# Patient Record
Sex: MALE | Race: WHITE | NOT HISPANIC OR LATINO | Employment: OTHER | URBAN - METROPOLITAN AREA
[De-identification: names, ages, dates, MRNs, and addresses within clinical notes are randomized per-mention and may not be internally consistent; named-entity substitution may affect disease eponyms.]

---

## 2017-03-29 DIAGNOSIS — Z12.11 ENCOUNTER FOR SCREENING FOR MALIGNANT NEOPLASM OF COLON: ICD-10-CM

## 2017-04-04 ENCOUNTER — ALLSCRIPTS OFFICE VISIT (OUTPATIENT)
Dept: OTHER | Facility: OTHER | Age: 77
End: 2017-04-04

## 2017-10-02 ENCOUNTER — ALLSCRIPTS OFFICE VISIT (OUTPATIENT)
Dept: OTHER | Facility: OTHER | Age: 77
End: 2017-10-02

## 2017-10-03 NOTE — PROGRESS NOTES
Assessment  1  Chronic obstructive pulmonary disease (496) (J44 9)   2  Atrial fibrillation (427 31) (I48 91)    Plan  Atrial fibrillation, Chronic obstructive pulmonary disease    · Follow-up visit in 6 months Evaluation and Treatment  Follow-up  Status: Hold For -  Scheduling  Requested for: 02Oct2017  Screening for colon cancer    · COLONOSCOPY; Status:Temporary Deferral - Pt requests deferral;    10/2/2018  Screening for depression    · *VB-Depression Screening; Status:Complete - Retrospective Authorization;   Done:  [id number] 12:53PM    Discussion/Summary    Patient will call us if he has any problems  Follow-up in 6 months or when necessary  Possible side effects of new medications were reviewed with the patient/guardian today  The treatment plan was reviewed with the patient/guardian  The patient/guardian understands and agrees with the treatment plan      Chief Complaint  Follow-up   Patient is here today for follow up of chronic conditions described in HPI  History of Present Illness  Patient here today for follow-up  Denies any chest pain or shortness of breath  The patient presents with a history of atrial fibrillation  The treatment strategy for this patient is rhythm control  He states his atrial fibrillation has been well controlled since the last visit  He has no comorbid illnesses  He has no significant interval events  Symptoms: The patient is currently asymptomatic  Associated symptoms include no syncope,-no focal neurologic deficit,-no tendency for easy bleeding-and-no tendency for easy bruising  Medications: The patient is not currently on any medications for his atrial fibrillation  The patient states he has been doing well with his COPD control since the last visit  He has no comorbid illnesses  He has no significant interval events  Symptoms: The patient is currently asymptomatic     Medications: the patient is adherent with his medication regimen -He denies medication side effects  Review of Systems    Constitutional: No fever or chills, feels well, no tiredness, no recent weight gain or weight loss  Cardiovascular: as noted in HPI  Respiratory: as noted in HPI  Gastrointestinal: No complaints of abdominal pain, no constipation, no nausea or vomiting, no diarrhea or bloody stools  Genitourinary: No complaints of dysuria, no incontinence, no hesitancy, no nocturia, no genital lesion, no testicular pain  Active Problems  1  Advance directive on file (V49 89) (Z78 9)   2  Atrial fibrillation (427 31) (I48 91)   3  Chronic obstructive pulmonary disease (496) (J44 9)   4  Encounter for screening for malignant neoplasm of colon (V76 51) (Z12 11)   5  Exercise counseling (V65 41) (Z71 82)   6  Rib pain on right side (786 50) (R07 81)   7  Screening for colon cancer (V76 51) (Z12 11)   8  Screening for depression (V79 0) (Z13 89)   9  Screening for genitourinary condition (V81 6) (Z13 89)   10  Special screening for other neurological conditions (V80 09) (Z13 89)   11  Unilateral recurrent inguinal hernia without obstruction or gangrene (550 91) (K40 91)    Past Medical History  1  History of Abdominal pain, RUQ (789 01) (R10 11)   2  History of Abnormal findings on diagnostic imaging of digestive system (793 4) (R93 3)   3  History of Acute back pain (724 5) (M54 9)   4  History of Acute right-sided back pain, unspecified location   5  History of Atypical chest pain (786 59) (R07 89)   6  History of Closed Fracture Of The Pubis (808 2)   7  History of Community acquired pneumonia (5) (J18 9)   8  History of Finger infection (686 9) (L08 9)   9  History of allergic rhinitis (V12 69) (Z87 09)   10  History of atrial fibrillation (V12 59) (Z86 79)   11  History of dizziness (V13 89) (Z87 898)   12  History of Nodule of right lung (793 11) (R91 1)   13  History of Pleural Effusion (511 9)   14  History of Polyuria (788 42) (R35 8)   15   History of Traumatic Pneumothorax (860 0)   16  History of Wound Infection (958 3)    The active problems and past medical history were reviewed and updated today  Surgical History  1  History of Hernia Repair   2  History of Oral Surgery Tooth Extraction    The surgical history was reviewed and updated today  Family History  Father    1  Family history of Diabetes Mellitus (250 00)    The family history was reviewed and updated today  Social History   · Advance directive on file (G63 49) (Z78 9)   · Former smoker (V15 82) (F09 200)   · Marital History - Currently    · No alcohol use   · Retired From Work  The social history was reviewed and updated today  The social history was reviewed and is unchanged  Current Meds   1  B-1 100 MG Oral Tablet; Take 1 tablet daily Recorded   2  [employer] Zinc TABS; Therapy: (Recorded:04Apr2016) to Recorded   3  Diclofenac Sodium 75 MG Oral Tablet Delayed Release; one by mouth twice daily with   food as needed for pain; Therapy: 62GMD2297 to (Keke Topete)  Requested for: 39NQG8094; Last   Rx:04Apr2017 Ordered   4  [place] 150 MG Oral Capsule Recorded   5  Methocarbamol 750 MG Oral Tablet; TAKE 1 TABLET AT BEDTIME; Therapy: 78UAW5136 to (Evaluate:22Tcs9706)  Requested for: 45CSB8731; Last   Rx:19Fbi9232 Ordered   6  V-R Vit C/Juanita Hips TABS; Therapy: (Recorded:07Apr2015) to Recorded    The medication list was reviewed and updated today  Allergies  1  Oil of [place] CAPS    Vitals  Vital Signs    Recorded: 65ELH7889 90:46IE   Systolic 493   Diastolic 70   Height 6 ft    Weight 128 lb 6 oz   BMI Calculated 17 41   BSA Calculated 1 76     Physical Exam    Constitutional   General appearance: No acute distress, well appearing and well nourished  Pulmonary   Respiratory effort: No increased work of breathing or signs of respiratory distress  Auscultation of lungs: Clear to auscultation, equal breath sounds bilaterally, no wheezes, no rales, no rhonci      Cardiovascular Auscultation of heart: Normal rate and rhythm, normal S1 and S2, without murmurs  Examination of extremities for edema and/or varicosities: Normal     Psychiatric   Orientation to person, place and time: Normal     Mood and affect: Normal          Results/Data  *VB-Depression Screening 42DUR4319 12:53PM Saida Nuñez     Test Name Result Flag Reference   Depression Scale Result      Depression Screen - Negative For Symptoms     Falls Risk Assessment (Dx Z13 89 Screen for Neurologic Disorder) 75COH0462 12:52PM User, Ahs     Test Name Result Flag Reference   Falls Risk      No falls in the past year     PHQ-2 Adult Depression Screening 74EDL7500 12:52PM User, Ahs     Test Name Result Flag Reference   PHQ-2 Adult Depression Score 0     Over the last two weeks, how often have you been bothered by any of the following problems? Little interest or pleasure in doing things: Not at all - 0  Feeling down, depressed, or hopeless: Not at all - 0   PHQ-2 Adult Depression Screening Negative         Future Appointments    Date/Time Provider Specialty Site   12/05/2017 01:15 PM Saida Nuñez DO Family Medicine [address] [place] [address]   10/06/2017 11:00 AM KATHY Garza   Cardiology  CARDIOLOGY  [place]     Signatures   Electronically signed by : Shahriar Todd DO; Oct  2 2017  1:42PM EST                       (Author)

## 2017-10-06 ENCOUNTER — ALLSCRIPTS OFFICE VISIT (OUTPATIENT)
Dept: OTHER | Facility: OTHER | Age: 77
End: 2017-10-06

## 2017-10-10 ENCOUNTER — GENERIC CONVERSION - ENCOUNTER (OUTPATIENT)
Dept: OTHER | Facility: OTHER | Age: 77
End: 2017-10-10

## 2017-10-10 ENCOUNTER — HOSPITAL ENCOUNTER (OUTPATIENT)
Dept: ULTRASOUND IMAGING | Facility: HOSPITAL | Age: 77
Discharge: HOME | End: 2017-10-10
Payer: COMMERCIAL

## 2017-10-10 DIAGNOSIS — M79.661 PAIN OF RIGHT LOWER LEG: ICD-10-CM

## 2017-10-10 PROCEDURE — 93971 EXTREMITY STUDY: CPT

## 2017-12-05 ENCOUNTER — ALLSCRIPTS OFFICE VISIT (OUTPATIENT)
Dept: OTHER | Facility: OTHER | Age: 77
End: 2017-12-05

## 2017-12-06 NOTE — PROGRESS NOTES
Assessment    1  Atrial fibrillation (427 31) (I48 91)   2  Chronic obstructive pulmonary disease (496) (J44 9)    Plan  Atrial fibrillation    · Aspirin 81 MG Oral Tablet Delayed Release; TAKE 1 TABLET DAILY  Atrial fibrillation, Chronic obstructive pulmonary disease    · Follow-up visit in 3 months Evaluation and Treatment  Follow-up  Status: Hold For -Scheduling  Requested for: 81YLF5013  Screening for colon cancer    · COLONOSCOPY; Status:Temporary Deferral - Pt requests deferral;    12/5/2018  Screening for genitourinary condition    · *VB - Fall Risk Assessment  (Dx Z13 89 Screen for Neurologic Disorder);Status:Complete;   Done: 01BRH2523 01:17PM    Discussion/Summary    For his postnasal drip, get Claritin over-the-counter and may take plain Robitussin  Push fluids  Call symptoms continue or increase  Possible side effects of new medications were reviewed with the patient/guardian today  The treatment plan was reviewed with the patient/guardian  The patient/guardian understands and agrees with the treatment plan      Chief Complaint  Follow-up  Congested   Patient is here today for follow up of chronic conditions described in HPI  History of Present Illness  Patient here today for follow-up  Patient has been having some congestion  Mild cough  No fever chills  No increased shortness of breath  The patient presents with paroxysmal atrial fibrillation  He states his atrial fibrillation has been stable since the last visit  He has no comorbid illnesses  He has no significant interval events  Symptoms: The patient is currently asymptomatic  Medications: the patient is adherent with his medication regimen  -- He denies medication side effects  The patient states he has been stable with his COPD control since the last visit  Symptoms: worsened coughing  Medications: The patient is not currently on any medications for his COPD        Review of Systems   Constitutional: No fever or chills, feels well, no tiredness, no recent weight gain or weight loss  ENT: as noted in HPI  Cardiovascular: as noted in HPI  Respiratory: as noted in HPI  Gastrointestinal: No complaints of abdominal pain, no constipation, no nausea or vomiting, no diarrhea or bloody stools  Genitourinary: No complaints of dysuria, no incontinence, no hesitancy, no nocturia, no genital lesion, no testicular pain  Active Problems  1  Advance directive on file (V49 89) (Z78 9)   2  Atrial fibrillation (427 31) (I48 91)   3  Chronic obstructive pulmonary disease (496) (J44 9)   4  Encounter for screening for malignant neoplasm of colon (V76 51) (Z12 11)   5  Exercise counseling (V65 41) (Z71 82)   6  Lower leg pain, right (729 5) (M79 661)   7  Rib pain on right side (786 50) (R07 81)   8  Screening for colon cancer (V76 51) (Z12 11)   9  Screening for depression (V79 0) (Z13 89)   10  Screening for genitourinary condition (V81 6) (Z13 89)   11  Special screening for other neurological conditions (V80 09) (Z13 89)   12  Unilateral recurrent inguinal hernia without obstruction or gangrene (550 91) (K40 91)    Past Medical History  1  History of Abdominal pain, RUQ (789 01) (R10 11)   2  History of Abnormal findings on diagnostic imaging of digestive system (793 4) (R93 3)   3  History of Acute back pain (724 5) (M54 9)   4  History of Acute right-sided back pain, unspecified location   5  History of Atypical chest pain (786 59) (R07 89)   6  History of Closed Fracture Of The Pubis (808 2)   7  History of Community acquired pneumonia (5) (J18 9)   8  History of Finger infection (686 9) (L08 9)   9  History of allergic rhinitis (V12 69) (Z87 09)   10  History of atrial fibrillation (V12 59) (Z86 79)   11  History of dizziness (V13 89) (Z87 898)   12  History of Nodule of right lung (793 11) (R91 1)   13  History of Pleural Effusion (511 9)   14  History of Polyuria (788 42) (R35 8)   15  History of Traumatic Pneumothorax (860 0)   16   History of Wound Infection (958 3)    The active problems and past medical history were reviewed and updated today  Surgical History  1  History of Hernia Repair   2  History of Oral Surgery Tooth Extraction    The surgical history was reviewed and updated today  Family History  Father    1  Family history of Diabetes Mellitus (250 00)    The family history was reviewed and updated today  Social History     · Advance directive on file (W44 00) (Z78 9)   · Former smoker (V15 82) (M19 908)   · Marital History - Currently    · No alcohol use   · Retired From Work  The social history was reviewed and updated today  The social history was reviewed and is unchanged  Current Meds   1  B-1 100 MG Oral Tablet; Take 1 tablet daily Recorded   2  [employer] Z[employer] TABS; Therapy: (Recorded:04Apr2016) to Recorded   3  Diclofenac Sodium 75 MG Oral Tablet Delayed Release; one by mouth twice daily with food as needed for pain; Therapy: 72VHZ2536 to (Kraig Rao)  Requested for: 17RKD2946; Last Rx:04Apr2017 Ordered   4  [place] 150 MG Oral Capsule Recorded   5  Methocarbamol 750 MG Oral Tablet; TAKE 1 TABLET AT BEDTIME; Therapy: 59OPV8612 to (Evaluate:24Gpl5193)  Requested for: 50LWQ2239; Last Rx:96Vke6152 Ordered   6  V-R Vit C/Juanita Hips TABS; Therapy: (Recorded:07Apr2015) to Recorded    The medication list was reviewed and updated today  Allergies  1  Oil of [place] CAPS    Vitals  Vital Signs    Recorded: 46NBY3806 78:13YX   Systolic 112   Diastolic 60   Height 6 ft    Weight 132 lb 8 oz   BMI Calculated 17 97   BSA Calculated 1 79       Physical Exam   Constitutional  General appearance: No acute distress, well appearing and well nourished  Ears, Nose, Mouth, and Throat  Oropharynx: Abnormal   The posterior pharynx Mucus postnasal drip, but-- was not erythematous-- and-- did not have an exudate  Pulmonary  Respiratory effort: No increased work of breathing or signs of respiratory distress  Auscultation of lungs: Clear to auscultation, equal breath sounds bilaterally, no wheezes, no rales, no rhonci  Cardiovascular  Auscultation of heart: Normal rate and rhythm, normal S1 and S2, without murmurs     Examination of extremities for edema and/or varicosities: Normal    Psychiatric  Orientation to person, place and time: Normal    Mood and affect: Normal          Results/Data  *VB - Fall Risk Assessment  (Dx Z13 89 Screen for Neurologic Disorder) 10BZO4402 01:17PM [employer]     Test Name Result Flag Reference   Falls Risk      No falls in the past year         Signatures   Electronically signed by : Erick Vivar DO; Dec  5 2017  2:00PM EST                       (Author)

## 2018-01-10 NOTE — MISCELLANEOUS
Message  Patient requested EKG be sent to our office from [facility] today where is was having his workup done for upcoming hernia surgery  Josefina Augustine   was told EKG was abnormal and called here  Reviewed chart with PG and EKG that was sent   pt has hx of PAF EKg from 4/6/16 shows Afib  Per Dr Nicolasa Shearer give patient an appointment for tomorrow and he will be pulled over to see patient  Patient called [facility] and ask to have surgeon send information about upcoming procedure  Active Problems    1  Abdominal pain, RUQ (789 01) (R10 11)   2  Abnormal findings on diagnostic imaging of digestive system (793 4) (R93 3)   3  Allergic rhinitis (477 9) (J30 9)   4  Atrial fibrillation (427 31) (I48 91)   5  Chronic obstructive pulmonary disease (496) (J44 9)   6  Dizziness (780 4) (R42)   7  Encounter for screening for malignant neoplasm of colon (V76 51) (Z12 11)   8  Finger infection (686 9) (L08 9)   9  Need for prophylactic vaccination and inoculation against influenza (V04 81) (Z23)   10  Nodule of right lung (793 11) (R91 1)   11  Special screening for other neurological conditions (V80 09) (Z13 89)   12  Unilateral recurrent inguinal hernia without obstruction or gangrene (550 91) (K40 91)    Current Meds   1  Aspirin 81 MG Oral Tablet; TAKE 1 TABLET DAILY; Therapy: 24DSV7774 to Recorded   2  B-1 100 MG Oral Tablet; Take 1 tablet daily Recorded   3  [employer] Z[employer] TABS; Therapy: (Recorded:54Sgv3372) to Recorded   4  [place] 150 MG Oral Capsule Recorded   5  V-R Vit C/Juanita Hips TABS; Therapy: (Recorded:58Sow5442) to Recorded    Allergies    1   No Known Drug Allergies    Signatures   Electronically signed by : Kylie Bustamante, ; Apr 6 2016  1:31PM EST                       (Author)

## 2018-01-13 VITALS
HEIGHT: 72 IN | DIASTOLIC BLOOD PRESSURE: 70 MMHG | SYSTOLIC BLOOD PRESSURE: 128 MMHG | BODY MASS INDEX: 17.39 KG/M2 | WEIGHT: 128.38 LBS

## 2018-01-13 VITALS
WEIGHT: 134.13 LBS | DIASTOLIC BLOOD PRESSURE: 78 MMHG | BODY MASS INDEX: 18.17 KG/M2 | SYSTOLIC BLOOD PRESSURE: 140 MMHG | HEIGHT: 72 IN

## 2018-01-13 NOTE — RESULT NOTES
Verified Results  VAS LOWER LIMB VENOUS DUPLEX STUDY, UNILATERAL/LIMITED 79MJU1826 12:56PM Michael Castanon Order Number: [id number]    - Patient Instructions: To schedule this appointment, please contact Central Scheduling at [phone]  Test Name Result Flag Reference   VAS LOWER LIMB VENOUS DUPLEX STUDY, UNILATERAL/LIMITED (Report)     [facility] REPORT   CLINICAL:   Indications: Right Limb Pain [M79 609]  Right Calf pain   The patient has no history of hypercoagulable state or obesity  Clinical: Palp abnormality right distal calf  FINDINGS:      Segment Right      Left          Impression    Impression       CFV   Normal (Patent) Normal (Patent)             CONCLUSION:   Impression:   RIGHT LOWER LIMB: NORMAL   No evidence of acute or chronic deep vein thrombosis   No evidence of superficial thrombophlebitis noted  Doppler evaluation shows a normal response to augmentation maneuvers  Popliteal, posterior tibial and anterior tibial arterial Doppler waveforms are   triphasic   LEFT LOWER LIMB LIMITED: NORMAL   Evaluation shows no evidence of thrombus in the common femoral vein  Doppler evaluation shows a normal response to augmentation maneuvers  Tech note: Preliminary report given to [facility] @ Dr Manjinder Andersen office        SIGNATURE:   Electronically Signed by: Santo Montana MD, RPVI on 2017-10-10 02:35:02 PM

## 2018-01-22 VITALS
SYSTOLIC BLOOD PRESSURE: 102 MMHG | BODY MASS INDEX: 17.95 KG/M2 | DIASTOLIC BLOOD PRESSURE: 60 MMHG | HEIGHT: 72 IN | WEIGHT: 132.5 LBS

## 2018-03-19 ENCOUNTER — TELEPHONE (OUTPATIENT)
Dept: FAMILY MEDICINE CLINIC | Facility: CLINIC | Age: 78
End: 2018-03-19

## 2018-03-19 DIAGNOSIS — I48.91 ATRIAL FIBRILLATION, UNSPECIFIED TYPE (HCC): ICD-10-CM

## 2018-03-19 DIAGNOSIS — J42 CHRONIC BRONCHITIS, UNSPECIFIED CHRONIC BRONCHITIS TYPE (HCC): Primary | ICD-10-CM

## 2018-03-19 PROBLEM — M79.661 PAIN OF RIGHT LOWER LEG: Status: ACTIVE | Noted: 2017-10-10

## 2018-03-19 RX ORDER — DICLOFENAC SODIUM 75 MG/1
TABLET, DELAYED RELEASE ORAL
COMMUNITY
Start: 2016-06-03 | End: 2018-07-11 | Stop reason: ALTCHOICE

## 2018-03-19 RX ORDER — METHION/INOS/CHOL BT/B COM/LIV 110MG-86MG
1 CAPSULE ORAL DAILY
COMMUNITY

## 2018-03-19 RX ORDER — B-COMPLEX WITH VITAMIN C
TABLET ORAL
COMMUNITY

## 2018-03-19 RX ORDER — UBIDECARENONE 200 MG
CAPSULE ORAL
COMMUNITY

## 2018-03-19 RX ORDER — ASPIRIN 81 MG/1
1 TABLET ORAL DAILY
COMMUNITY
Start: 2017-12-05 | End: 2018-08-22

## 2018-03-19 RX ORDER — METHOCARBAMOL 750 MG/1
1 TABLET, FILM COATED ORAL
COMMUNITY
Start: 2016-06-03 | End: 2018-07-11 | Stop reason: ALTCHOICE

## 2018-03-19 NOTE — TELEPHONE ENCOUNTER
Needs order for complete labs  he has been having some problems with his toes and kalyani believes he should have complete labs   Done  hafsa for diabetis and all

## 2018-04-04 ENCOUNTER — OFFICE VISIT (OUTPATIENT)
Dept: FAMILY MEDICINE CLINIC | Facility: CLINIC | Age: 78
End: 2018-04-04
Payer: COMMERCIAL

## 2018-04-04 ENCOUNTER — TELEPHONE (OUTPATIENT)
Dept: FAMILY MEDICINE CLINIC | Facility: CLINIC | Age: 78
End: 2018-04-04

## 2018-04-04 VITALS
DIASTOLIC BLOOD PRESSURE: 60 MMHG | WEIGHT: 139 LBS | HEIGHT: 72 IN | SYSTOLIC BLOOD PRESSURE: 104 MMHG | BODY MASS INDEX: 18.83 KG/M2

## 2018-04-04 DIAGNOSIS — J06.9 UPPER RESPIRATORY TRACT INFECTION, UNSPECIFIED TYPE: Primary | ICD-10-CM

## 2018-04-04 PROCEDURE — 3725F SCREEN DEPRESSION PERFORMED: CPT | Performed by: FAMILY MEDICINE

## 2018-04-04 PROCEDURE — 99213 OFFICE O/P EST LOW 20 MIN: CPT | Performed by: FAMILY MEDICINE

## 2018-04-04 RX ORDER — GUAIFENESIN AND CODEINE PHOSPHATE 100; 10 MG/5ML; MG/5ML
5 SOLUTION ORAL 3 TIMES DAILY PRN
Qty: 120 ML | Refills: 0 | Status: SHIPPED | OUTPATIENT
Start: 2018-04-04 | End: 2018-07-11 | Stop reason: ALTCHOICE

## 2018-04-04 NOTE — PROGRESS NOTES
Assessment/Plan:  Patient will advance diet as tolerated  Push fluids  Rx for Robitussin with codeine if needed  Call symptoms continue or increase  We will try to track down his blood work  Follow-up in 6 months or p r n  No problem-specific Assessment & Plan notes found for this encounter  Diagnoses and all orders for this visit:    Upper respiratory tract infection, unspecified type  -     guaifenesin-codeine (GUAIFENESIN AC) 100-10 MG/5ML liquid; Take 5 mL by mouth 3 (three) times a day as needed for cough          Subjective:      Patient ID: Rommel Young is a 78 y o  male  Patient here stating that last week started with cough and congestion nausea and vomiting and diarrhea  Vomiting diarrhea has resolved, still feels a little nauseous  Still coughing mostly at night  No fever chills  URI    This is a new problem  The current episode started in the past 7 days  The problem has been gradually improving  Associated symptoms include congestion, coughing, diarrhea (Resolved), nausea and vomiting ( resolved)  Pertinent negatives include no chest pain  He has tried nothing for the symptoms  The following portions of the patient's history were reviewed and updated as appropriate:   He  has a past medical history of Allergic rhinitis; Atrial fibrillation ([address] ); Atypical chest pain; Closed fracture of pubis ([address] ); Community acquired pneumonia; Nodule of right lung; Pleural effusion; Polyuria; and Traumatic pneumothorax  He   Patient Active Problem List    Diagnosis Date Noted    Pain of right lower leg 10/10/2017    Rib pain on right side 06/01/2016    Atrial fibrillation ([address] ) 04/18/2013    Chronic obstructive pulmonary disease ([address] ) 04/18/2013     He  has a past surgical history that includes Hernia repair and Tooth extraction  His family history includes Diabetes in his father  He  reports that he quit smoking about 22 years ago  His smoking use included Pipe and Cigars   He has never used smokeless tobacco  He reports that he does not drink alcohol or use drugs  Current Outpatient Prescriptions   Medication Sig Dispense Refill    ascorbic acid with irene hips 1000 MG tablet Take by mouth      aspirin (ECOTRIN LOW STRENGTH) 81 mg EC tablet Take 1 tablet by mouth daily      diclofenac (VOLTAREN) 75 mg EC tablet Take by mouth      [place] 150 MG CAPS Take by mouth      methocarbamol (ROBAXIN) 750 mg tablet Take 1 tablet by mouth      Thiamine HCl (VITAMIN B-1) 100 MG TABS Take 1 tablet by mouth daily      Zinc 100 MG TABS Take by mouth      guaifenesin-codeine (GUAIFENESIN AC) 100-10 MG/5ML liquid Take 5 mL by mouth 3 (three) times a day as needed for cough 120 mL 0     No current facility-administered medications for this visit  Current Outpatient Prescriptions on File Prior to Visit   Medication Sig    ascorbic acid with irene hips 1000 MG tablet Take by mouth    aspirin (ECOTRIN LOW STRENGTH) 81 mg EC tablet Take 1 tablet by mouth daily    diclofenac (VOLTAREN) 75 mg EC tablet Take by mouth    [place] 150 MG CAPS Take by mouth    methocarbamol (ROBAXIN) 750 mg tablet Take 1 tablet by mouth    Thiamine HCl (VITAMIN B-1) 100 MG TABS Take 1 tablet by mouth daily    Zinc 100 MG TABS Take by mouth     No current facility-administered medications on file prior to visit  He is allergic to origanum oil       Review of Systems   Constitutional: Negative  HENT: Positive for congestion  Respiratory: Positive for cough  Cardiovascular: Negative for chest pain  Gastrointestinal: Positive for diarrhea (Resolved), nausea and vomiting ( resolved)  Genitourinary: Negative  Objective:      /60   Ht 6' (1 829 m)   Wt 63 kg (139 lb)   BMI 18 85 kg/m²          Physical Exam   Constitutional: He is oriented to person, place, and time  He appears well-developed and well-nourished  No distress  Cardiovascular: Normal rate  No murmur heard    Heart rate irregularly irregular   Pulmonary/Chest: Effort normal and breath sounds normal  No respiratory distress  He has no wheezes  He has no rales  Neurological: He is alert and oriented to person, place, and time  Psychiatric: He has a normal mood and affect   His behavior is normal

## 2018-04-16 ENCOUNTER — TELEPHONE (OUTPATIENT)
Dept: FAMILY MEDICINE CLINIC | Facility: CLINIC | Age: 78
End: 2018-04-16

## 2018-04-16 DIAGNOSIS — R05.3 CHRONIC COUGH: Primary | ICD-10-CM

## 2018-04-16 NOTE — TELEPHONE ENCOUNTER
HE IS STILL NO BETTER  Ferol Jean Marie HE HAS A DRY HACKING COUGH  WHAT SHOULD THEY DO DO YOU THINK HE NEEDS CXR/  ALSO STILL NO LABS

## 2018-04-17 ENCOUNTER — TELEPHONE (OUTPATIENT)
Dept: FAMILY MEDICINE CLINIC | Facility: CLINIC | Age: 78
End: 2018-04-17

## 2018-04-17 DIAGNOSIS — R91.1 LUNG NODULE: Primary | ICD-10-CM

## 2018-04-17 NOTE — TELEPHONE ENCOUNTER
Chest x-ray showed a 19 millimeter nodule right middle lobe  They recommended CT scan  Patient had a nodule 4 years ago, but that was in the lower lobe on the right side  I will order another CT scan to evaluate

## 2018-04-18 ENCOUNTER — TELEPHONE (OUTPATIENT)
Dept: FAMILY MEDICINE CLINIC | Facility: CLINIC | Age: 78
End: 2018-04-18

## 2018-04-18 DIAGNOSIS — R91.1 LUNG NODULE: Primary | ICD-10-CM

## 2018-04-18 NOTE — TELEPHONE ENCOUNTER
Call need to re order ct  Needs   ct chest with contrast that what radiologist recommends    Fax to [phone]

## 2018-04-25 ENCOUNTER — TELEPHONE (OUTPATIENT)
Dept: FAMILY MEDICINE CLINIC | Facility: CLINIC | Age: 78
End: 2018-04-25

## 2018-04-25 DIAGNOSIS — R91.1 NODULE OF LEFT LUNG: Primary | ICD-10-CM

## 2018-04-25 NOTE — TELEPHONE ENCOUNTER
Cat scan shows a left sided lung nodule 13 x 7 millimeters  Suspicious for lung cancer  We will get a PET scan    I put in the order, please give to Fer Smith to schedule were patient wants to get it done

## 2018-04-25 NOTE — TELEPHONE ENCOUNTER
CT CHEST RESULTS    13X7MM NODULE IN LEFT UPPER LOBE, SUSPICIOUS FOR NEOPLASM  PET SCAN IS RECOMMENDED   NO ABNORMALITY IN MEDIAL ASPECT OF RIGHT UPPER LOBE  THEY FAXED IT HERE BUT WE STILL HAVE NOT GOTTEN IT

## 2018-05-03 ENCOUNTER — TELEPHONE (OUTPATIENT)
Dept: FAMILY MEDICINE CLINIC | Facility: CLINIC | Age: 78
End: 2018-05-03

## 2018-05-03 DIAGNOSIS — R91.1 NODULE OF LEFT LUNG: Primary | ICD-10-CM

## 2018-05-03 NOTE — TELEPHONE ENCOUNTER
Pet scan showed the left lung nodule  It did have some uptake, may be infectious versus new malignancy  Recommend follow-up CT scan, noncontrast, in 3 months  We could also refer him to pulmonology, if agreeable    I will put in the order for the CT scan

## 2018-05-04 ENCOUNTER — TELEPHONE (OUTPATIENT)
Dept: FAMILY MEDICINE CLINIC | Facility: CLINIC | Age: 78
End: 2018-05-04

## 2018-05-04 ENCOUNTER — TELEPHONE (OUTPATIENT)
Dept: PULMONOLOGY | Facility: CLINIC | Age: 78
End: 2018-05-04

## 2018-05-04 NOTE — TELEPHONE ENCOUNTER
I called patient to offer him an appt for Monday 5/7 w/ Dr Rena Franklin made special arrangements to squeeze patient into his busy schedule  When I first called patient, he was friendly and appreciative for getting him in so soon  He then asked if we could call [facility] to request the disk so he could pick it up  I spoke with Tona Matias from [facility]) and she told me they will try their best to have the disk ready by no later than 6pm tonight  I called patient back for the 2nd time to give him all the info, this time pt was rude the whole time, using profanity, refusing to  the disk, he then stated "I'm not picking up the disk, you are going to  the disk" and that he will call me back  I right away call Dr Asha Cloud office, spoke with his Nurse Thomas Face to notify her of what the patient had said  Nurse said she will call the patient and talk to him  Nurse called me back to inform me "Pt does not want to come in because whoever they talked to hang up on him twice" I reassured her, that never happened and that I actually had a co-worker next to me the whole time  Dr Rena Franklin was notified

## 2018-05-04 NOTE — TELEPHONE ENCOUNTER
They called and said they were not happy with the pulmonary drs office that called swelling of the ankles they want someone else  Teresa Bynum gave them info for drs  they called back and have appt Monday at 2:30 with dr Nanci Mcardle in [place]    FYI

## 2018-05-18 ENCOUNTER — TELEPHONE (OUTPATIENT)
Dept: FAMILY MEDICINE CLINIC | Facility: CLINIC | Age: 78
End: 2018-05-18

## 2018-05-18 DIAGNOSIS — J06.9 UPPER RESPIRATORY TRACT INFECTION, UNSPECIFIED TYPE: Primary | ICD-10-CM

## 2018-05-18 RX ORDER — BENZONATATE 100 MG/1
100 CAPSULE ORAL 3 TIMES DAILY PRN
Qty: 20 CAPSULE | Refills: 0 | Status: SHIPPED | OUTPATIENT
Start: 2018-05-18 | End: 2018-05-24 | Stop reason: SDUPTHER

## 2018-05-18 RX ORDER — CEFDINIR 300 MG/1
300 CAPSULE ORAL EVERY 12 HOURS SCHEDULED
Qty: 20 CAPSULE | Refills: 0 | Status: SHIPPED | OUTPATIENT
Start: 2018-05-18 | End: 2018-05-28

## 2018-05-18 NOTE — TELEPHONE ENCOUNTER
I put in for [address] and [employer]  Push fluids    Make appointment if symptoms continue or increase

## 2018-05-18 NOTE — TELEPHONE ENCOUNTER
Wife called very concerned about patients dry cough, they have not received any results from Pulmonary but they want something done right away as antibiotic or cough syrup bc he is not getting any better

## 2018-05-24 DIAGNOSIS — J06.9 UPPER RESPIRATORY TRACT INFECTION, UNSPECIFIED TYPE: ICD-10-CM

## 2018-05-24 RX ORDER — BENZONATATE 100 MG/1
100 CAPSULE ORAL 3 TIMES DAILY PRN
Qty: 30 CAPSULE | Refills: 0 | Status: SHIPPED | OUTPATIENT
Start: 2018-05-24 | End: 2018-06-14 | Stop reason: SDUPTHER

## 2018-05-24 NOTE — TELEPHONE ENCOUNTER
The med you gave him is working wonderful but the benazotate you only gave 20  If you want it for 10 days he needs 30

## 2018-06-13 ENCOUNTER — TELEPHONE (OUTPATIENT)
Dept: FAMILY MEDICINE CLINIC | Facility: CLINIC | Age: 78
End: 2018-06-13

## 2018-06-13 NOTE — TELEPHONE ENCOUNTER
RELAPSING AGAIN, WET COUGH, NOT SLEEPING WELL  APPETITE UP AND DOWN, NIGHT SWEATS  DO YOU WANT TO SEE HIM OR PRESCRIBE SOMETHING AGAIN?

## 2018-06-14 ENCOUNTER — OFFICE VISIT (OUTPATIENT)
Dept: FAMILY MEDICINE CLINIC | Facility: CLINIC | Age: 78
End: 2018-06-14
Payer: COMMERCIAL

## 2018-06-14 VITALS
HEIGHT: 72 IN | WEIGHT: 136.2 LBS | BODY MASS INDEX: 18.45 KG/M2 | DIASTOLIC BLOOD PRESSURE: 90 MMHG | TEMPERATURE: 98.6 F | SYSTOLIC BLOOD PRESSURE: 130 MMHG

## 2018-06-14 DIAGNOSIS — J06.9 UPPER RESPIRATORY TRACT INFECTION, UNSPECIFIED TYPE: Primary | ICD-10-CM

## 2018-06-14 PROCEDURE — 99213 OFFICE O/P EST LOW 20 MIN: CPT | Performed by: FAMILY MEDICINE

## 2018-06-14 RX ORDER — CEFDINIR 300 MG/1
300 CAPSULE ORAL EVERY 12 HOURS SCHEDULED
Qty: 20 CAPSULE | Refills: 0 | Status: SHIPPED | OUTPATIENT
Start: 2018-06-14 | End: 2018-06-24

## 2018-06-14 RX ORDER — BENZONATATE 100 MG/1
100 CAPSULE ORAL 3 TIMES DAILY PRN
Qty: 30 CAPSULE | Refills: 0 | Status: SHIPPED | OUTPATIENT
Start: 2018-06-14 | End: 2018-07-11 | Stop reason: ALTCHOICE

## 2018-06-14 NOTE — PROGRESS NOTES
Assessment/Plan:  Rx put in for [employer] and [employer] again  Push fluids  Call if symptoms continue or increase  Follow up with pulmonology as scheduled  It appears he is back in Afib again  He will continue his aspirin therapy and follow up with Cardiology as scheduled  No problem-specific Assessment & Plan notes found for this encounter  Diagnoses and all orders for this visit:    Upper respiratory tract infection, unspecified type  -     benzonatate (TESSALON PERLES) 100 mg capsule; Take 1 capsule (100 mg total) by mouth 3 (three) times a day as needed for cough  -     cefdinir (OMNICEF) 300 mg capsule; Take 1 capsule (300 mg total) by mouth every 12 (twelve) hours for 10 days    Other orders  -     Iron-Vitamins (GERITOL COMPLETE PO); Take by mouth          Subjective:      Patient ID: Harmony Mackenzie is a 78 y o  male  Patient here today stating for the last couple days has had a mildly productive cough, sweating off and on  No nausea vomiting or diarrhea  It feels like the cold the had last month  That cold resolved on Omnicef and Tessalon Perles  Pulmonology put him on Anoro,  Which gave him side effects of anxiety and anal leakage  Patient stopped it  Cough   This is a recurrent problem  The current episode started in the past 7 days  The problem has been unchanged  The problem occurs constantly  The cough is productive of sputum (mildly productive)  Associated symptoms include nasal congestion and sweats  Pertinent negatives include no fever or wheezing  Nothing aggravates the symptoms  He has tried a beta-agonist inhaler and ipratropium inhaler for the symptoms  The treatment provided no relief  His past medical history is significant for COPD  The following portions of the patient's history were reviewed and updated as appropriate:   He  has a past medical history of Allergic rhinitis; Atrial fibrillation ([address] );  Atypical chest pain; Closed fracture of pubis ([address] ); Community acquired pneumonia; Nodule of right lung; Pleural effusion; Polyuria; and Traumatic pneumothorax  He   Patient Active Problem List    Diagnosis Date Noted    Nodule of left lung 04/25/2018    Pain of right lower leg 10/10/2017    Rib pain on right side 06/01/2016    Atrial fibrillation ([facility] ) 04/18/2013    Chronic obstructive pulmonary disease ([facility] ) 04/18/2013     He  has a past surgical history that includes Hernia repair and Tooth extraction  His family history includes Diabetes in his father  He  reports that he quit smoking about 22 years ago  His smoking use included Pipe and Cigars  He has never used smokeless tobacco  He reports that he does not drink alcohol or use drugs  Current Outpatient Prescriptions   Medication Sig Dispense Refill    ascorbic acid with irene hips 1000 MG tablet Take by mouth      aspirin (ECOTRIN LOW STRENGTH) 81 mg EC tablet Take 1 tablet by mouth daily      diclofenac (VOLTAREN) 75 mg EC tablet Take by mouth      [place] 150 MG CAPS Take by mouth      Iron-Vitamins (GERITOL COMPLETE PO) Take by mouth      methocarbamol (ROBAXIN) 750 mg tablet Take 1 tablet by mouth      Thiamine HCl (VITAMIN B-1) 100 MG TABS Take 1 tablet by mouth daily      Zinc 100 MG TABS Take by mouth      benzonatate (TESSALON PERLES) 100 mg capsule Take 1 capsule (100 mg total) by mouth 3 (three) times a day as needed for cough 30 capsule 0    cefdinir (OMNICEF) 300 mg capsule Take 1 capsule (300 mg total) by mouth every 12 (twelve) hours for 10 days 20 capsule 0    guaifenesin-codeine (GUAIFENESIN AC) 100-10 MG/5ML liquid Take 5 mL by mouth 3 (three) times a day as needed for cough 120 mL 0     No current facility-administered medications for this visit        Current Outpatient Prescriptions on File Prior to Visit   Medication Sig    ascorbic acid with irene hips 1000 MG tablet Take by mouth    aspirin (ECOTRIN LOW STRENGTH) 81 mg EC tablet Take 1 tablet by mouth daily    diclofenac (VOLTAREN) 75 mg EC tablet Take by mouth    [place] 150 MG CAPS Take by mouth    methocarbamol (ROBAXIN) 750 mg tablet Take 1 tablet by mouth    Thiamine HCl (VITAMIN B-1) 100 MG TABS Take 1 tablet by mouth daily    Zinc 100 MG TABS Take by mouth    guaifenesin-codeine (GUAIFENESIN AC) 100-10 MG/5ML liquid Take 5 mL by mouth 3 (three) times a day as needed for cough    [DISCONTINUED] benzonatate (TESSALON PERLES) 100 mg capsule Take 1 capsule (100 mg total) by mouth 3 (three) times a day as needed for cough     No current facility-administered medications on file prior to visit  He is allergic to anoro ellipta [umeclidinium-vilanterol] and origanum oil       Review of Systems   Constitutional: Negative for fever  Respiratory: Positive for cough  Negative for wheezing  Cardiovascular: Negative  Gastrointestinal: Negative  Genitourinary: Negative  Objective:      /90   Temp 98 6 °F (37 °C)   Ht 6' (1 829 m)   Wt 61 8 kg (136 lb 3 2 oz)   BMI 18 47 kg/m²          Physical Exam   Constitutional: He is oriented to person, place, and time  He appears well-developed and well-nourished  No distress  Cardiovascular: Normal rate and normal heart sounds  An irregularly irregular rhythm present  Exam reveals no friction rub  No murmur heard  Pulmonary/Chest: Effort normal and breath sounds normal  No respiratory distress  He has no wheezes  He has no rales  Musculoskeletal: He exhibits no edema  Neurological: He is alert and oriented to person, place, and time  Skin: He is not diaphoretic  Psychiatric: He has a normal mood and affect  His behavior is normal  Judgment and thought content normal    Vitals reviewed

## 2018-07-11 ENCOUNTER — OFFICE VISIT (OUTPATIENT)
Dept: FAMILY MEDICINE CLINIC | Facility: CLINIC | Age: 78
End: 2018-07-11
Payer: COMMERCIAL

## 2018-07-11 VITALS
TEMPERATURE: 97.7 F | DIASTOLIC BLOOD PRESSURE: 84 MMHG | SYSTOLIC BLOOD PRESSURE: 118 MMHG | BODY MASS INDEX: 19.39 KG/M2 | WEIGHT: 143.2 LBS | HEIGHT: 72 IN

## 2018-07-11 DIAGNOSIS — J42 CHRONIC BRONCHITIS, UNSPECIFIED CHRONIC BRONCHITIS TYPE (HCC): ICD-10-CM

## 2018-07-11 DIAGNOSIS — F41.9 ANXIETY: ICD-10-CM

## 2018-07-11 DIAGNOSIS — R05.3 CHRONIC COUGH: Primary | ICD-10-CM

## 2018-07-11 PROCEDURE — 99213 OFFICE O/P EST LOW 20 MIN: CPT | Performed by: FAMILY MEDICINE

## 2018-07-11 RX ORDER — BENZONATATE 100 MG/1
100 CAPSULE ORAL 3 TIMES DAILY PRN
Qty: 20 CAPSULE | Refills: 2 | Status: SHIPPED | OUTPATIENT
Start: 2018-07-11 | End: 2018-09-05 | Stop reason: ALTCHOICE

## 2018-07-11 NOTE — PROGRESS NOTES
Assessment/Plan: We will try am on Asmanex inhaler  Rx for [employer]  He will get a repeat CT scan in August   We will refer him to pulmonology  He does not want to treat his anxiety at this time  We will see him back as scheduled or p r n  No problem-specific Assessment & Plan notes found for this encounter  Diagnoses and all orders for this visit:    Chronic cough  -     benzonatate (TESSALON PERLES) 100 mg capsule; Take 1 capsule (100 mg total) by mouth 3 (three) times a day as needed for cough  -     mometasone (ASMANEX TWISTHALER) 220 MCG/INH inhaler; Inhale 1 puff 2 (two) times a day Rinse mouth after use  -     Ambulatory referral to Pulmonology; Future    Anxiety    Chronic bronchitis, unspecified chronic bronchitis type ([facility] )  -     Ambulatory referral to Pulmonology; Future          Subjective:      Patient ID: Sharri Sharma is a 78 y o  male  Patient here today with continued chronic cough  Cough is dry  No fever chills  No nausea vomiting  Patient states the [id number] helped him  Patient is scheduled for a repeat CT scan in August   He does not have a follow-up with pulmonology  Patient has been more under more stress lately due to an ongoing divorce  No SI or HI  Cough   This is a chronic problem  The current episode started more than 1 month ago  The problem has been unchanged  The problem occurs every few hours  The cough is non-productive  Associated symptoms include shortness of breath  Pertinent negatives include no fever, sore throat, sweats or weight loss  Nothing aggravates the symptoms  Treatments tried: Tessalon Perles  The treatment provided mild relief  His past medical history is significant for COPD  Anxiety   Presents for initial visit  Onset was at an unknown time  The problem has been unchanged  Symptoms include shortness of breath  Patient reports no suicidal ideas  Symptoms occur constantly   The severity of symptoms is moderate  The symptoms are aggravated by family issues  The quality of sleep is fair  Past treatments include nothing  The following portions of the patient's history were reviewed and updated as appropriate:   He  has a past medical history of Allergic rhinitis; Atrial fibrillation ([facility] ); Atypical chest pain; Closed fracture of pubis ([facility] ); Community acquired pneumonia; Nodule of right lung; Pleural effusion; Polyuria; and Traumatic pneumothorax  He   Patient Active Problem List    Diagnosis Date Noted    Chronic cough 07/11/2018    Anxiety 07/11/2018    Nodule of left lung 04/25/2018    Pain of right lower leg 10/10/2017    Rib pain on right side 06/01/2016    Atrial fibrillation (HCC) 04/18/2013    Chronic obstructive pulmonary disease ([facility] ) 04/18/2013     He  has a past surgical history that includes Hernia repair and Tooth extraction  His family history includes Diabetes in his father  He  reports that he quit smoking about 22 years ago  His smoking use included Pipe and Cigars  He has never used smokeless tobacco  He reports that he does not drink alcohol or use drugs  Current Outpatient Prescriptions   Medication Sig Dispense Refill    ascorbic acid with irene hips 1000 MG tablet Take by mouth      aspirin (ECOTRIN LOW STRENGTH) 81 mg EC tablet Take 1 tablet by mouth daily      [place] 150 MG CAPS Take by mouth      Iron-Vitamins (GERITOL COMPLETE PO) Take by mouth      Thiamine HCl (VITAMIN B-1) 100 MG TABS Take 1 tablet by mouth daily      Zinc 100 MG TABS Take by mouth      benzonatate (TESSALON PERLES) 100 mg capsule Take 1 capsule (100 mg total) by mouth 3 (three) times a day as needed for cough 20 capsule 2    mometasone (ASMANEX TWISTHALER) 220 MCG/INH inhaler Inhale 1 puff 2 (two) times a day Rinse mouth after use  1 Inhaler 0     No current facility-administered medications for this visit        Current Outpatient Prescriptions on File Prior to Visit   Medication Sig    ascorbic acid with irene hips 1000 MG tablet Take by mouth    aspirin (ECOTRIN LOW STRENGTH) 81 mg EC tablet Take 1 tablet by mouth daily    [place] 150 MG CAPS Take by mouth    Iron-Vitamins (GERITOL COMPLETE PO) Take by mouth    Thiamine HCl (VITAMIN B-1) 100 MG TABS Take 1 tablet by mouth daily    Zinc 100 MG TABS Take by mouth    [DISCONTINUED] benzonatate (TESSALON PERLES) 100 mg capsule Take 1 capsule (100 mg total) by mouth 3 (three) times a day as needed for cough    [DISCONTINUED] diclofenac (VOLTAREN) 75 mg EC tablet Take by mouth    [DISCONTINUED] guaifenesin-codeine (GUAIFENESIN AC) 100-10 MG/5ML liquid Take 5 mL by mouth 3 (three) times a day as needed for cough    [DISCONTINUED] methocarbamol (ROBAXIN) 750 mg tablet Take 1 tablet by mouth     No current facility-administered medications on file prior to visit  He is allergic to anoro ellipta [umeclidinium-vilanterol] and origanum oil       Review of Systems   Constitutional: Negative for fever and weight loss  HENT: Negative for sore throat  Respiratory: Positive for cough and shortness of breath  Cardiovascular: Negative  Gastrointestinal: Negative  Genitourinary: Negative  Psychiatric/Behavioral: Negative for suicidal ideas  Objective:      /84 (BP Location: Left arm, Patient Position: Sitting)   Temp 97 7 °F (36 5 °C)   Ht 6' (1 829 m)   Wt 65 kg (143 lb 3 2 oz)   BMI 19 42 kg/m²          Physical Exam   Constitutional: He is oriented to person, place, and time  He appears well-developed and well-nourished  No distress  Cardiovascular: Normal rate  An irregularly irregular rhythm present  Exam reveals no gallop  No murmur heard  Pulmonary/Chest: Breath sounds normal  No respiratory distress  He has no wheezes  He has no rales  Musculoskeletal: He exhibits no edema  Neurological: He is alert and oriented to person, place, and time  Skin: He is not diaphoretic     Psychiatric: He has a normal mood and affect  His behavior is normal  Judgment and thought content normal    Vitals reviewed

## 2018-07-16 ENCOUNTER — APPOINTMENT (OUTPATIENT)
Dept: LAB | Facility: MEDICAL CENTER | Age: 78
End: 2018-07-16
Payer: COMMERCIAL

## 2018-07-16 ENCOUNTER — OFFICE VISIT (OUTPATIENT)
Dept: FAMILY MEDICINE CLINIC | Facility: CLINIC | Age: 78
End: 2018-07-16
Payer: COMMERCIAL

## 2018-07-16 ENCOUNTER — APPOINTMENT (OUTPATIENT)
Dept: RADIOLOGY | Facility: MEDICAL CENTER | Age: 78
End: 2018-07-16
Payer: COMMERCIAL

## 2018-07-16 ENCOUNTER — TRANSCRIBE ORDERS (OUTPATIENT)
Dept: RADIOLOGY | Facility: MEDICAL CENTER | Age: 78
End: 2018-07-16

## 2018-07-16 VITALS
DIASTOLIC BLOOD PRESSURE: 82 MMHG | WEIGHT: 144 LBS | TEMPERATURE: 98.6 F | SYSTOLIC BLOOD PRESSURE: 140 MMHG | BODY MASS INDEX: 19.5 KG/M2 | HEIGHT: 72 IN

## 2018-07-16 DIAGNOSIS — I48.91 ATRIAL FIBRILLATION, UNSPECIFIED TYPE (HCC): ICD-10-CM

## 2018-07-16 DIAGNOSIS — J42 CHRONIC BRONCHITIS, UNSPECIFIED CHRONIC BRONCHITIS TYPE (HCC): ICD-10-CM

## 2018-07-16 DIAGNOSIS — R05.3 CHRONIC COUGH: Primary | ICD-10-CM

## 2018-07-16 DIAGNOSIS — R05.3 CHRONIC COUGH: ICD-10-CM

## 2018-07-16 LAB
ALBUMIN SERPL BCP-MCNC: 3.5 G/DL (ref 3.5–5)
ALP SERPL-CCNC: 79 U/L (ref 46–116)
ALT SERPL W P-5'-P-CCNC: 63 U/L (ref 12–78)
ANION GAP SERPL CALCULATED.3IONS-SCNC: 6 MMOL/L (ref 4–13)
AST SERPL W P-5'-P-CCNC: 53 U/L (ref 5–45)
BASOPHILS # BLD AUTO: 0.05 THOUSANDS/ΜL (ref 0–0.1)
BASOPHILS NFR BLD AUTO: 1 % (ref 0–1)
BILIRUB SERPL-MCNC: 1.13 MG/DL (ref 0.2–1)
BUN SERPL-MCNC: 20 MG/DL (ref 5–25)
CALCIUM SERPL-MCNC: 8.7 MG/DL (ref 8.3–10.1)
CHLORIDE SERPL-SCNC: 109 MMOL/L (ref 100–108)
CO2 SERPL-SCNC: 23 MMOL/L (ref 21–32)
CREAT SERPL-MCNC: 1.07 MG/DL (ref 0.6–1.3)
EOSINOPHIL # BLD AUTO: 0.1 THOUSAND/ΜL (ref 0–0.61)
EOSINOPHIL NFR BLD AUTO: 1 % (ref 0–6)
ERYTHROCYTE [DISTWIDTH] IN BLOOD BY AUTOMATED COUNT: 14.6 % (ref 11.6–15.1)
GFR SERPL CREATININE-BSD FRML MDRD: 68 ML/MIN/1.73SQ M
GLUCOSE SERPL-MCNC: 76 MG/DL (ref 65–140)
HCT VFR BLD AUTO: 47.9 % (ref 36.5–49.3)
HGB BLD-MCNC: 15.1 G/DL (ref 12–17)
IMM GRANULOCYTES # BLD AUTO: 0.04 THOUSAND/UL (ref 0–0.2)
IMM GRANULOCYTES NFR BLD AUTO: 0 % (ref 0–2)
LYMPHOCYTES # BLD AUTO: 3.51 THOUSANDS/ΜL (ref 0.6–4.47)
LYMPHOCYTES NFR BLD AUTO: 36 % (ref 14–44)
MCH RBC QN AUTO: 32.3 PG (ref 26.8–34.3)
MCHC RBC AUTO-ENTMCNC: 31.5 G/DL (ref 31.4–37.4)
MCV RBC AUTO: 103 FL (ref 82–98)
MONOCYTES # BLD AUTO: 0.92 THOUSAND/ΜL (ref 0.17–1.22)
MONOCYTES NFR BLD AUTO: 9 % (ref 4–12)
NEUTROPHILS # BLD AUTO: 5.28 THOUSANDS/ΜL (ref 1.85–7.62)
NEUTS SEG NFR BLD AUTO: 53 % (ref 43–75)
NRBC BLD AUTO-RTO: 0 /100 WBCS
NT-PROBNP SERPL-MCNC: ABNORMAL PG/ML
PLATELET # BLD AUTO: 139 THOUSANDS/UL (ref 149–390)
PMV BLD AUTO: 11.8 FL (ref 8.9–12.7)
POTASSIUM SERPL-SCNC: 4.3 MMOL/L (ref 3.5–5.3)
PROT SERPL-MCNC: 7.4 G/DL (ref 6.4–8.2)
RBC # BLD AUTO: 4.67 MILLION/UL (ref 3.88–5.62)
SODIUM SERPL-SCNC: 138 MMOL/L (ref 136–145)
TSH SERPL DL<=0.05 MIU/L-ACNC: 2.87 UIU/ML (ref 0.36–3.74)
WBC # BLD AUTO: 9.9 THOUSAND/UL (ref 4.31–10.16)

## 2018-07-16 PROCEDURE — 71046 X-RAY EXAM CHEST 2 VIEWS: CPT

## 2018-07-16 PROCEDURE — 36415 COLL VENOUS BLD VENIPUNCTURE: CPT | Performed by: FAMILY MEDICINE

## 2018-07-16 PROCEDURE — 99214 OFFICE O/P EST MOD 30 MIN: CPT | Performed by: FAMILY MEDICINE

## 2018-07-16 PROCEDURE — 83880 ASSAY OF NATRIURETIC PEPTIDE: CPT | Performed by: FAMILY MEDICINE

## 2018-07-16 PROCEDURE — 84443 ASSAY THYROID STIM HORMONE: CPT | Performed by: FAMILY MEDICINE

## 2018-07-16 PROCEDURE — 80053 COMPREHEN METABOLIC PANEL: CPT | Performed by: FAMILY MEDICINE

## 2018-07-16 PROCEDURE — 85025 COMPLETE CBC W/AUTO DIFF WBC: CPT | Performed by: FAMILY MEDICINE

## 2018-07-16 RX ORDER — POTASSIUM CHLORIDE 750 MG/1
10 CAPSULE, EXTENDED RELEASE ORAL DAILY
Qty: 30 CAPSULE | Refills: 3 | Status: SHIPPED | OUTPATIENT
Start: 2018-07-16 | End: 2018-07-27 | Stop reason: SDUPTHER

## 2018-07-16 RX ORDER — FUROSEMIDE 20 MG/1
20 TABLET ORAL DAILY
Qty: 30 TABLET | Refills: 3 | Status: SHIPPED | OUTPATIENT
Start: 2018-07-16 | End: 2018-07-27 | Stop reason: SDUPTHER

## 2018-07-16 NOTE — PROGRESS NOTES
Assessment/Plan:  I believe patient might have some CHF and blood work today  We will place him on 20 mg of Lasix p r n  Along with potassium 10 mEq daily  They will call us if his legs continue to swell  We will see him back as scheduled or p r n  No problem-specific Assessment & Plan notes found for this encounter  Diagnoses and all orders for this visit:    Chronic cough  -     furosemide (LASIX) 20 mg tablet; Take 1 tablet (20 mg total) by mouth daily  -     potassium chloride (MICRO-K) 10 MEQ CR capsule; Take 1 capsule (10 mEq total) by mouth daily  -     NT-BNP PRO  -     TSH, 3rd generation with Free T4 reflex  -     CBC and differential  -     Comprehensive metabolic panel  -     XR chest pa & lateral; Future    Atrial fibrillation, unspecified type (HCC)  -     furosemide (LASIX) 20 mg tablet; Take 1 tablet (20 mg total) by mouth daily  -     potassium chloride (MICRO-K) 10 MEQ CR capsule; Take 1 capsule (10 mEq total) by mouth daily  -     NT-BNP PRO  -     TSH, 3rd generation with Free T4 reflex  -     CBC and differential  -     Comprehensive metabolic panel  -     XR chest pa & lateral; Future    Chronic bronchitis, unspecified chronic bronchitis type (HCC)  -     CBC and differential  -     XR chest pa & lateral; Future          Subjective:      Patient ID: Barbie Neri is a 78 y o  male  Patient here today with continued cough and shortness of breath  Worse when he is lying down  His ankles and lower legs have begun to swell  It is better in the morning when he gets up  No nausea vomiting or fever chills  Cough has been dry  Breathing Problem   He complains of cough, difficulty breathing and shortness of breath  This is a chronic problem  The problem occurs constantly  The problem has been unchanged  Associated symptoms include dyspnea on exertion and orthopnea  Pertinent negatives include no chest pain or weight loss  His symptoms are aggravated by nothing   His symptoms are alleviated by nothing  His past medical history is significant for COPD  The following portions of the patient's history were reviewed and updated as appropriate:   He  has a past medical history of Allergic rhinitis; Atrial fibrillation ([address] ); Atypical chest pain; Closed fracture of pubis ([facility] ); Community acquired pneumonia; Nodule of right lung; Pleural effusion; Polyuria; and Traumatic pneumothorax  He   Patient Active Problem List    Diagnosis Date Noted    Chronic cough 07/11/2018    Anxiety 07/11/2018    Nodule of left lung 04/25/2018    Pain of right lower leg 10/10/2017    Rib pain on right side 06/01/2016    Atrial fibrillation (HCC) 04/18/2013    Chronic obstructive pulmonary disease ([facility] ) 04/18/2013     He  has a past surgical history that includes Hernia repair and Tooth extraction  His family history includes Diabetes in his father  He  reports that he quit smoking about 22 years ago  His smoking use included Pipe and Cigars  He has never used smokeless tobacco  He reports that he does not drink alcohol or use drugs  Current Outpatient Prescriptions   Medication Sig Dispense Refill    ascorbic acid with irene hips 1000 MG tablet Take by mouth      aspirin (ECOTRIN LOW STRENGTH) 81 mg EC tablet Take 1 tablet by mouth daily      benzonatate (TESSALON PERLES) 100 mg capsule Take 1 capsule (100 mg total) by mouth 3 (three) times a day as needed for cough 20 capsule 2    furosemide (LASIX) 20 mg tablet Take 1 tablet (20 mg total) by mouth daily 30 tablet 3    [place] 150 MG CAPS Take by mouth      Iron-Vitamins (GERITOL COMPLETE PO) Take by mouth      mometasone (ASMANEX TWISTHALER) 220 MCG/INH inhaler Inhale 1 puff 2 (two) times a day Rinse mouth after use   1 Inhaler 0    potassium chloride (MICRO-K) 10 MEQ CR capsule Take 1 capsule (10 mEq total) by mouth daily 30 capsule 3    Thiamine HCl (VITAMIN B-1) 100 MG TABS Take 1 tablet by mouth daily      Zinc 100 MG TABS Take by mouth       No current facility-administered medications for this visit  Current Outpatient Prescriptions on File Prior to Visit   Medication Sig    ascorbic acid with irene hips 1000 MG tablet Take by mouth    aspirin (ECOTRIN LOW STRENGTH) 81 mg EC tablet Take 1 tablet by mouth daily    benzonatate (TESSALON PERLES) 100 mg capsule Take 1 capsule (100 mg total) by mouth 3 (three) times a day as needed for cough    [place] 150 MG CAPS Take by mouth    Iron-Vitamins (GERITOL COMPLETE PO) Take by mouth    mometasone (ASMANEX TWISTHALER) 220 MCG/INH inhaler Inhale 1 puff 2 (two) times a day Rinse mouth after use   Thiamine HCl (VITAMIN B-1) 100 MG TABS Take 1 tablet by mouth daily    Zinc 100 MG TABS Take by mouth     No current facility-administered medications on file prior to visit  He is allergic to anoro ellipta [umeclidinium-vilanterol] and origanum oil       Review of Systems   Constitutional: Positive for unexpected weight change ( gaining weight)  Negative for weight loss  Respiratory: Positive for cough and shortness of breath  Cardiovascular: Positive for dyspnea on exertion and leg swelling  Negative for chest pain  Gastrointestinal: Negative  Endocrine: Negative  Genitourinary: Negative  Objective:      /82   Temp 98 6 °F (37 °C)   Ht 6' (1 829 m)   Wt 65 3 kg (144 lb)   BMI 19 53 kg/m²          Physical Exam   Constitutional: He is oriented to person, place, and time  He appears well-developed and well-nourished  No distress  Cardiovascular: S1 normal and S2 normal   An irregularly irregular rhythm present  Tachycardia present  No murmur heard  Pulmonary/Chest: Effort normal and breath sounds normal  No respiratory distress  He has no wheezes  He has no rales  Musculoskeletal: He exhibits edema ( positive edema in the ankles bilaterally)  Neurological: He is alert and oriented to person, place, and time  Skin: He is not diaphoretic  Psychiatric: He has a normal mood and affect  His behavior is normal  Judgment and thought content normal    Vitals reviewed

## 2018-07-17 DIAGNOSIS — I48.91 ATRIAL FIBRILLATION, UNSPECIFIED TYPE (HCC): Primary | ICD-10-CM

## 2018-07-20 ENCOUNTER — TELEPHONE (OUTPATIENT)
Dept: FAMILY MEDICINE CLINIC | Facility: CLINIC | Age: 78
End: 2018-07-20

## 2018-07-20 ENCOUNTER — APPOINTMENT (OUTPATIENT)
Dept: LAB | Facility: MEDICAL CENTER | Age: 78
End: 2018-07-20
Payer: COMMERCIAL

## 2018-07-20 LAB
ALBUMIN SERPL BCP-MCNC: 3.6 G/DL (ref 3.5–5)
ALP SERPL-CCNC: 80 U/L (ref 46–116)
ALT SERPL W P-5'-P-CCNC: 52 U/L (ref 12–78)
ANION GAP SERPL CALCULATED.3IONS-SCNC: 7 MMOL/L (ref 4–13)
AST SERPL W P-5'-P-CCNC: 40 U/L (ref 5–45)
BASOPHILS # BLD AUTO: 0.05 THOUSANDS/ΜL (ref 0–0.1)
BASOPHILS NFR BLD AUTO: 1 % (ref 0–1)
BILIRUB SERPL-MCNC: 0.97 MG/DL (ref 0.2–1)
BUN SERPL-MCNC: 24 MG/DL (ref 5–25)
CALCIUM SERPL-MCNC: 8.8 MG/DL (ref 8.3–10.1)
CHLORIDE SERPL-SCNC: 105 MMOL/L (ref 100–108)
CHOLEST SERPL-MCNC: 111 MG/DL (ref 50–200)
CO2 SERPL-SCNC: 26 MMOL/L (ref 21–32)
CREAT SERPL-MCNC: 1.13 MG/DL (ref 0.6–1.3)
EOSINOPHIL # BLD AUTO: 0.15 THOUSAND/ΜL (ref 0–0.61)
EOSINOPHIL NFR BLD AUTO: 2 % (ref 0–6)
ERYTHROCYTE [DISTWIDTH] IN BLOOD BY AUTOMATED COUNT: 14.1 % (ref 11.6–15.1)
EST. AVERAGE GLUCOSE BLD GHB EST-MCNC: 131 MG/DL
GFR SERPL CREATININE-BSD FRML MDRD: 63 ML/MIN/1.73SQ M
GLUCOSE P FAST SERPL-MCNC: 93 MG/DL (ref 65–99)
HBA1C MFR BLD: 6.2 % (ref 4.2–6.3)
HCT VFR BLD AUTO: 47.9 % (ref 36.5–49.3)
HDLC SERPL-MCNC: 39 MG/DL (ref 40–60)
HGB BLD-MCNC: 15.2 G/DL (ref 12–17)
IMM GRANULOCYTES # BLD AUTO: 0.04 THOUSAND/UL (ref 0–0.2)
IMM GRANULOCYTES NFR BLD AUTO: 0 % (ref 0–2)
LDLC SERPL CALC-MCNC: 52 MG/DL (ref 0–100)
LYMPHOCYTES # BLD AUTO: 2.62 THOUSANDS/ΜL (ref 0.6–4.47)
LYMPHOCYTES NFR BLD AUTO: 28 % (ref 14–44)
MCH RBC QN AUTO: 32.2 PG (ref 26.8–34.3)
MCHC RBC AUTO-ENTMCNC: 31.7 G/DL (ref 31.4–37.4)
MCV RBC AUTO: 102 FL (ref 82–98)
MONOCYTES # BLD AUTO: 0.85 THOUSAND/ΜL (ref 0.17–1.22)
MONOCYTES NFR BLD AUTO: 9 % (ref 4–12)
NEUTROPHILS # BLD AUTO: 5.76 THOUSANDS/ΜL (ref 1.85–7.62)
NEUTS SEG NFR BLD AUTO: 60 % (ref 43–75)
NRBC BLD AUTO-RTO: 0 /100 WBCS
NT-PROBNP SERPL-MCNC: 3660 PG/ML
PLATELET # BLD AUTO: 162 THOUSANDS/UL (ref 149–390)
PMV BLD AUTO: 11.7 FL (ref 8.9–12.7)
POTASSIUM SERPL-SCNC: 4.3 MMOL/L (ref 3.5–5.3)
PROT SERPL-MCNC: 7.7 G/DL (ref 6.4–8.2)
RBC # BLD AUTO: 4.72 MILLION/UL (ref 3.88–5.62)
SODIUM SERPL-SCNC: 138 MMOL/L (ref 136–145)
TRIGL SERPL-MCNC: 99 MG/DL
TSH SERPL DL<=0.05 MIU/L-ACNC: 1.76 UIU/ML (ref 0.36–3.74)
WBC # BLD AUTO: 9.47 THOUSAND/UL (ref 4.31–10.16)

## 2018-07-20 PROCEDURE — 36415 COLL VENOUS BLD VENIPUNCTURE: CPT

## 2018-07-20 PROCEDURE — 83880 ASSAY OF NATRIURETIC PEPTIDE: CPT | Performed by: FAMILY MEDICINE

## 2018-07-20 PROCEDURE — 83036 HEMOGLOBIN GLYCOSYLATED A1C: CPT

## 2018-07-20 PROCEDURE — 80053 COMPREHEN METABOLIC PANEL: CPT

## 2018-07-20 PROCEDURE — 85025 COMPLETE CBC W/AUTO DIFF WBC: CPT

## 2018-07-20 PROCEDURE — 80061 LIPID PANEL: CPT

## 2018-07-20 PROCEDURE — 84443 ASSAY THYROID STIM HORMONE: CPT
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
## 2018-07-20 NOTE — TELEPHONE ENCOUNTER
I called patient  We do not have the blood work back yet  Swelling is better  He is breathing better  Told him to go to 1 Lasix daily  If symptoms start to increase, then go back up to 1 Lasix twice a day  Continuing the potassium with each dose of Lasix  We will call  with results when we get them  They will call us if any problems
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
## 2018-07-23 ENCOUNTER — TELEPHONE (OUTPATIENT)
Dept: CARDIOLOGY CLINIC | Facility: CLINIC | Age: 78
End: 2018-07-23

## 2018-07-23 ENCOUNTER — TELEPHONE (OUTPATIENT)
Dept: FAMILY MEDICINE CLINIC | Facility: CLINIC | Age: 78
End: 2018-07-23

## 2018-07-23 NOTE — TELEPHONE ENCOUNTER
Phone call from Dr Selwyn Kiser , he has CHF  Dr Piyush Lama is requesting a sooner appt than 8/22, when he is scheduled  All notes and recent labs are in [facility] for review and explaination  His original BNP was 10,000 but it then dropped to 3,000 per    Can you " review records, and see if he can be squeezed in?"

## 2018-07-23 NOTE — TELEPHONE ENCOUNTER
Please call his office  Tell them that he is in CHF, BNP was over 10,000, has improved to 3000 on Lasix

## 2018-07-23 NOTE — TELEPHONE ENCOUNTER
Spoke to scheduling nothing sooner,transferred to nurse, left voice mail with symtoms to see for sooner appt

## 2018-07-23 NOTE — TELEPHONE ENCOUNTER
Ani would like us to call Dr Tata Noe to get him in sooner,  [phone]  If ok with you and need info

## 2018-07-26 PROBLEM — I50.9 ACUTE CONGESTIVE HEART FAILURE (HCC): Status: ACTIVE | Noted: 2018-07-26

## 2018-07-27 ENCOUNTER — OFFICE VISIT (OUTPATIENT)
Dept: CARDIOLOGY CLINIC | Facility: CLINIC | Age: 78
End: 2018-07-27
Payer: COMMERCIAL

## 2018-07-27 VITALS
BODY MASS INDEX: 18.15 KG/M2 | HEART RATE: 88 BPM | DIASTOLIC BLOOD PRESSURE: 60 MMHG | SYSTOLIC BLOOD PRESSURE: 110 MMHG | WEIGHT: 134 LBS | HEIGHT: 72 IN

## 2018-07-27 DIAGNOSIS — I48.91 ATRIAL FIBRILLATION, UNSPECIFIED TYPE (HCC): ICD-10-CM

## 2018-07-27 DIAGNOSIS — I48.21 PERMANENT ATRIAL FIBRILLATION (HCC): ICD-10-CM

## 2018-07-27 DIAGNOSIS — R05.3 CHRONIC COUGH: ICD-10-CM

## 2018-07-27 DIAGNOSIS — I50.9 ACUTE CONGESTIVE HEART FAILURE, UNSPECIFIED HEART FAILURE TYPE (HCC): Primary | ICD-10-CM

## 2018-07-27 PROCEDURE — 99214 OFFICE O/P EST MOD 30 MIN: CPT | Performed by: INTERNAL MEDICINE

## 2018-07-27 RX ORDER — POTASSIUM CHLORIDE 750 MG/1
10 CAPSULE, EXTENDED RELEASE ORAL EVERY OTHER DAY
Qty: 30 CAPSULE | Refills: 0
Start: 2018-07-27 | End: 2018-08-22 | Stop reason: SDUPTHER

## 2018-07-27 RX ORDER — FUROSEMIDE 20 MG/1
20 TABLET ORAL EVERY OTHER DAY
Qty: 30 TABLET | Refills: 0
Start: 2018-07-27 | End: 2018-08-14 | Stop reason: HOSPADM

## 2018-07-27 NOTE — PROGRESS NOTES
Cardiology Follow Up    Leanne De Los Santos  1940  [id number]  [address] [id number] [phone]    Reason for visit: The patient is here for Congestive Heart failure    New dx    He also has a hx of PAF    1  Acute congestive heart failure, unspecified heart failure type ([facility] [address] )     2  Permanent atrial fibrillation (HCC)         Interval History: The patient developed coughing in March  He felt he had a viral  He then developed edema  He did have wt gain  He did have orthopnea  He have SOB  He denies chest pain  He denies palpitations  His wife saw his veins quivering in his neck  He denies dizziness  His cough was non productive  He saw his PCP  He was being treated with ABs which did not help  Eventually the dx of the CHF was made based on XR and elevated BNP  His BNP has improved as has his wt   He is about 8 lbs about baseline today    Patient Active Problem List   Diagnosis    Atrial fibrillation (HCC)    Chronic obstructive pulmonary disease (HCC)    Pain of right lower leg    Rib pain on right side    Nodule of left lung    Chronic cough    Anxiety    Acute congestive heart failure (HCC)     Past Medical History:   Diagnosis Date    Allergic rhinitis     last assessed [id number]    Atrial fibrillation ([facility] )     last assessed [id number]    Atypical chest pain     last assessed [id number]    [facility]     Community acquired pneumonia     last assessed [id number]    Nodule of right lung     last assessed [id number]    Pleural effusion     last assessed [id number]    Polyuria     last assessed [id number]    Traumatic pneumothorax      Social History     Social History    Marital status: /Civil Union     Spouse name: N/A    Number of children: N/A    Years of education: N/A     Occupational History    retired      tractor trailor  and farmer     Social History Main Topics    Smoking status: Former Smoker     Types: Pipe, Cigars     Quit date: 1996    Smokeless tobacco: Never Used    Alcohol use No    Drug use: No    Sexual activity: Not on file     Other Topics Concern    Not on file     Social History Narrative    Advance directive on file          Family History   Problem Relation Age of Onset    Diabetes Father      Past Surgical History:   Procedure Laterality Date    HERNIA REPAIR      TOOTH EXTRACTION         Current Outpatient Prescriptions:     ascorbic acid with irene hips 1000 MG tablet, Take by mouth, Disp: , Rfl:     aspirin (ECOTRIN LOW STRENGTH) 81 mg EC tablet, Take 1 tablet by mouth daily, Disp: , Rfl:     furosemide (LASIX) 20 mg tablet, Take 1 tablet (20 mg total) by mouth daily, Disp: 30 tablet, Rfl: 3    [place] 150 MG CAPS, Take by mouth, Disp: , Rfl:     Iron-Vitamins (GERITOL COMPLETE PO), Take by mouth, Disp: , Rfl:     potassium chloride (MICRO-K) 10 MEQ CR capsule, Take 1 capsule (10 mEq total) by mouth daily, Disp: 30 capsule, Rfl: 3    Thiamine HCl (VITAMIN B-1) 100 MG TABS, Take 1 tablet by mouth daily, Disp: , Rfl:     Zinc 100 MG TABS, Take by mouth, Disp: , Rfl:     benzonatate (TESSALON PERLES) 100 mg capsule, Take 1 capsule (100 mg total) by mouth 3 (three) times a day as needed for cough, Disp: 20 capsule, Rfl: 2    mometasone (ASMANEX TWISTHALER) 220 MCG/INH inhaler, Inhale 1 puff 2 (two) times a day Rinse mouth after use , Disp: 1 Inhaler, Rfl: 0  Allergies   Allergen Reactions    Anoro Ellipta [Umeclidinium-Vilanterol] Nausea Only     Heart burn, anal drainage    Origanum Oil        Review of Systems:  Review of Systems   Constitutional: Positive for appetite change and unexpected weight change  Negative for diaphoresis and fatigue  Respiratory: Positive for cough and shortness of breath  Negative for chest tightness and wheezing  Cardiovascular: Positive for leg swelling   Negative for chest pain and palpitations  Gastrointestinal: Positive for diarrhea (from antibiotics)  Negative for abdominal pain, constipation and nausea  Genitourinary: Positive for frequency  Negative for dysuria, hematuria and scrotal swelling  Musculoskeletal: Positive for arthralgias and back pain  Negative for gait problem and joint swelling  Neurological: Negative for dizziness, seizures, speech difficulty, light-headedness and headaches  Psychiatric/Behavioral: Negative for agitation, behavioral problems, confusion and decreased concentration  Physical Exam:  Vitals:    07/27/18 0951   BP: 110/60   BP Location: Left arm   Patient Position: Sitting   Cuff Size: Adult   Pulse: 88   Weight: 60 8 kg (134 lb)   Height: 6' (1 829 m)       Physical Exam   Constitutional: He is oriented to person, place, and time  He appears well-developed and well-nourished  No distress  HENT:   Head: Normocephalic and atraumatic  Mouth/Throat: No oropharyngeal exudate  Eyes: Conjunctivae are normal  No scleral icterus  Neck: Neck supple  Normal carotid pulses and no JVD present  Carotid bruit is not present  No thyromegaly present  Cardiovascular: An irregularly irregular rhythm present  Tachycardia present  Exam reveals no gallop and no friction rub  No murmur heard  Pulses:       Dorsalis pedis pulses are 0 on the right side, and 0 on the left side  Posterior tibial pulses are 0 on the right side, and 0 on the left side  Pulmonary/Chest: Breath sounds normal  He has no wheezes  He has no rhonchi  He has no rales  Abdominal: Soft  He exhibits no mass  There is no hepatosplenomegaly  There is no tenderness  Musculoskeletal: He exhibits edema (trace edema of the LEs) and deformity (kyphosis)  He exhibits no tenderness  Neurological: He is alert and oriented to person, place, and time  He has normal strength  No cranial nerve deficit or sensory deficit  Skin: Skin is warm and dry  No rash noted  No erythema   No pallor  Psychiatric: He has a normal mood and affect  His behavior is normal  Judgment and thought content normal        Discussion/Summary:  1  Acute CHF  Georgette Ormond Unspecified    Patient is in AFIB but was last year  ? Change in LV function    Will need echo    CHF improved with modest dosages of diuretics  Will decrease lasix to 20 mg QOD with KCL    Probably needs some more diuresis  2  AFIB-appears established  Rate high normal  Generally well controlled w/o meds and did poorly with digoxin and diltiazem  Georgette Ormond He has refused AC    Will consider Eliquis    FU in 2-3 weeks with echo           Leslee Jones MD

## 2018-08-03 ENCOUNTER — TELEPHONE (OUTPATIENT)
Dept: FAMILY MEDICINE CLINIC | Facility: CLINIC | Age: 78
End: 2018-08-03

## 2018-08-03 NOTE — TELEPHONE ENCOUNTER
COUGH HE HAS IS DIFFERENT, WAS A DRY COUGH BUT NOW IT IS WET, SHE IS CONCERNED  SHOULD HE COME IN FOR APPT?

## 2018-08-03 NOTE — TELEPHONE ENCOUNTER
I am fearful that he might be in worsening congestive heart failure    He should go to the ER for evaluation

## 2018-08-06 ENCOUNTER — TELEPHONE (OUTPATIENT)
Dept: CARDIOLOGY CLINIC | Facility: CLINIC | Age: 78
End: 2018-08-06

## 2018-08-06 NOTE — TELEPHONE ENCOUNTER
Pt's wife Ani called office this morning, stating Karla Margie is up 10 lbs in the last 3 days  Pt's weight 3 days ago was 128 lbs, this morning pt's weight is 138 lbs  Some swelling in left foot and leg  BP is also high 169/110, HR is ranging between  and has been irregular  Ani also restarted pt's furosemide 20 mg and potassium 10 meq  Ani would like to speak with Dr Rachel Phillips and would like a phone call back

## 2018-08-06 NOTE — TELEPHONE ENCOUNTER
Called kalyani Mercedes to take lasix daily with GORDON Russell Double up on lasix for one day then daily    Echo 8/17 and OV 8/22   To call if not improving in interim

## 2018-08-09 ENCOUNTER — TELEPHONE (OUTPATIENT)
Dept: CARDIOLOGY CLINIC | Facility: CLINIC | Age: 78
End: 2018-08-09

## 2018-08-09 ENCOUNTER — HOSPITAL ENCOUNTER (INPATIENT)
Facility: HOSPITAL | Age: 78
LOS: 5 days | Discharge: HOME | DRG: 308 | End: 2018-08-14
Attending: EMERGENCY MEDICINE | Admitting: HOSPITALIST
Payer: COMMERCIAL

## 2018-08-09 ENCOUNTER — APPOINTMENT (EMERGENCY)
Dept: RADIOLOGY | Facility: HOSPITAL | Age: 78
DRG: 308 | End: 2018-08-09
Payer: COMMERCIAL

## 2018-08-09 DIAGNOSIS — R07.81 RIB PAIN ON RIGHT SIDE: ICD-10-CM

## 2018-08-09 DIAGNOSIS — I48.91 ATRIAL FIBRILLATION, UNSPECIFIED TYPE (HCC): ICD-10-CM

## 2018-08-09 DIAGNOSIS — I50.9 CHF (CONGESTIVE HEART FAILURE) (HCC): ICD-10-CM

## 2018-08-09 DIAGNOSIS — I50.31 ACUTE DIASTOLIC HEART FAILURE (HCC): ICD-10-CM

## 2018-08-09 DIAGNOSIS — I48.91 ATRIAL FIBRILLATION, RAPID (HCC): Primary | ICD-10-CM

## 2018-08-09 DIAGNOSIS — R05.3 CHRONIC COUGH: ICD-10-CM

## 2018-08-09 PROBLEM — G25.81 RESTLESS LEG SYNDROME, UNCONTROLLED: Status: ACTIVE | Noted: 2018-08-09

## 2018-08-09 LAB
ALBUMIN SERPL BCP-MCNC: 3.4 G/DL (ref 3.5–5)
ALP SERPL-CCNC: 70 U/L (ref 46–116)
ALT SERPL W P-5'-P-CCNC: 46 U/L (ref 12–78)
ANION GAP SERPL CALCULATED.3IONS-SCNC: 5 MMOL/L (ref 4–13)
AST SERPL W P-5'-P-CCNC: 33 U/L (ref 5–45)
ATRIAL RATE: 197 BPM
BASOPHILS # BLD AUTO: 0.02 THOUSANDS/ΜL (ref 0–0.1)
BASOPHILS NFR BLD AUTO: 0 % (ref 0–1)
BILIRUB SERPL-MCNC: 0.97 MG/DL (ref 0.2–1)
BUN SERPL-MCNC: 24 MG/DL (ref 5–25)
CALCIUM SERPL-MCNC: 8.7 MG/DL (ref 8.3–10.1)
CHLORIDE SERPL-SCNC: 104 MMOL/L (ref 100–108)
CO2 SERPL-SCNC: 30 MMOL/L (ref 21–32)
CREAT SERPL-MCNC: 1.32 MG/DL (ref 0.6–1.3)
EOSINOPHIL # BLD AUTO: 0.07 THOUSAND/ΜL (ref 0–0.61)
EOSINOPHIL NFR BLD AUTO: 1 % (ref 0–6)
ERYTHROCYTE [DISTWIDTH] IN BLOOD BY AUTOMATED COUNT: 14.2 % (ref 11.6–15.1)
GFR SERPL CREATININE-BSD FRML MDRD: 52 ML/MIN/1.73SQ M
GLUCOSE SERPL-MCNC: 151 MG/DL (ref 65–140)
HCT VFR BLD AUTO: 44.9 % (ref 36.5–49.3)
HGB BLD-MCNC: 15 G/DL (ref 12–17)
LYMPHOCYTES # BLD AUTO: 2.04 THOUSANDS/ΜL (ref 0.6–4.47)
LYMPHOCYTES NFR BLD AUTO: 24 % (ref 14–44)
MCH RBC QN AUTO: 33.4 PG (ref 26.8–34.3)
MCHC RBC AUTO-ENTMCNC: 33.4 G/DL (ref 31.4–37.4)
MCV RBC AUTO: 100 FL (ref 82–98)
MONOCYTES # BLD AUTO: 0.57 THOUSAND/ΜL (ref 0.17–1.22)
MONOCYTES NFR BLD AUTO: 7 % (ref 4–12)
NEUTROPHILS # BLD AUTO: 5.98 THOUSANDS/ΜL (ref 1.85–7.62)
NEUTS SEG NFR BLD AUTO: 69 % (ref 43–75)
NRBC BLD AUTO-RTO: 0 /100 WBCS
PLATELET # BLD AUTO: 125 THOUSANDS/UL (ref 149–390)
PMV BLD AUTO: 12.1 FL (ref 8.9–12.7)
POTASSIUM SERPL-SCNC: 3.7 MMOL/L (ref 3.5–5.3)
PROT SERPL-MCNC: 7.2 G/DL (ref 6.4–8.2)
QRS AXIS: 61 DEGREES
QRSD INTERVAL: 118 MS
QT INTERVAL: 320 MS
QTC INTERVAL: 484 MS
RBC # BLD AUTO: 4.49 MILLION/UL (ref 3.88–5.62)
SODIUM SERPL-SCNC: 139 MMOL/L (ref 136–145)
T WAVE AXIS: 55 DEGREES
TROPONIN I SERPL-MCNC: 0.03 NG/ML
VENTRICULAR RATE: 138 BPM
WBC # BLD AUTO: 8.68 THOUSAND/UL (ref 4.31–10.16)

## 2018-08-09 PROCEDURE — 85025 COMPLETE CBC W/AUTO DIFF WBC: CPT

## 2018-08-09 PROCEDURE — 84484 ASSAY OF TROPONIN QUANT: CPT

## 2018-08-09 PROCEDURE — 80053 COMPREHEN METABOLIC PANEL: CPT

## 2018-08-09 PROCEDURE — 71045 X-RAY EXAM CHEST 1 VIEW: CPT

## 2018-08-09 PROCEDURE — 93005 ELECTROCARDIOGRAM TRACING: CPT

## 2018-08-09 PROCEDURE — 99222 1ST HOSP IP/OBS MODERATE 55: CPT | Performed by: INTERNAL MEDICINE

## 2018-08-09 PROCEDURE — 93010 ELECTROCARDIOGRAM REPORT: CPT | Performed by: INTERNAL MEDICINE

## 2018-08-09 PROCEDURE — 96374 THER/PROPH/DIAG INJ IV PUSH: CPT

## 2018-08-09 PROCEDURE — 99223 1ST HOSP IP/OBS HIGH 75: CPT | Performed by: HOSPITALIST

## 2018-08-09 PROCEDURE — 99285 EMERGENCY DEPT VISIT HI MDM: CPT

## 2018-08-09 PROCEDURE — 96375 TX/PRO/DX INJ NEW DRUG ADDON: CPT

## 2018-08-09 PROCEDURE — 36415 COLL VENOUS BLD VENIPUNCTURE: CPT

## 2018-08-09 RX ORDER — THIAMINE MONONITRATE (VIT B1) 100 MG
100 TABLET ORAL DAILY
Status: DISCONTINUED | OUTPATIENT
Start: 2018-08-09 | End: 2018-08-14 | Stop reason: HOSPADM

## 2018-08-09 RX ORDER — POTASSIUM CHLORIDE 20 MEQ/1
20 TABLET, EXTENDED RELEASE ORAL DAILY
Status: DISCONTINUED | OUTPATIENT
Start: 2018-08-09 | End: 2018-08-13

## 2018-08-09 RX ORDER — BENZONATATE 100 MG/1
100 CAPSULE ORAL 3 TIMES DAILY PRN
Status: DISCONTINUED | OUTPATIENT
Start: 2018-08-09 | End: 2018-08-14 | Stop reason: HOSPADM

## 2018-08-09 RX ORDER — FUROSEMIDE 10 MG/ML
40 INJECTION INTRAMUSCULAR; INTRAVENOUS ONCE
Status: COMPLETED | OUTPATIENT
Start: 2018-08-09 | End: 2018-08-09

## 2018-08-09 RX ORDER — LANOLIN ALCOHOL/MO/W.PET/CERES
6 CREAM (GRAM) TOPICAL
Status: DISCONTINUED | OUTPATIENT
Start: 2018-08-09 | End: 2018-08-14 | Stop reason: HOSPADM

## 2018-08-09 RX ORDER — DILTIAZEM HYDROCHLORIDE 5 MG/ML
10 INJECTION INTRAVENOUS ONCE
Status: COMPLETED | OUTPATIENT
Start: 2018-08-09 | End: 2018-08-09

## 2018-08-09 RX ORDER — LORAZEPAM 2 MG/ML
0.5 INJECTION INTRAMUSCULAR EVERY 4 HOURS PRN
Status: DISCONTINUED | OUTPATIENT
Start: 2018-08-09 | End: 2018-08-14 | Stop reason: HOSPADM

## 2018-08-09 RX ORDER — FUROSEMIDE 10 MG/ML
40 INJECTION INTRAMUSCULAR; INTRAVENOUS DAILY
Status: DISCONTINUED | OUTPATIENT
Start: 2018-08-09 | End: 2018-08-10

## 2018-08-09 RX ORDER — ZINC GLUCONATE 50 MG
100 TABLET ORAL DAILY
Status: DISCONTINUED | OUTPATIENT
Start: 2018-08-09 | End: 2018-08-14 | Stop reason: HOSPADM

## 2018-08-09 RX ORDER — DILTIAZEM HYDROCHLORIDE 180 MG/1
180 CAPSULE, COATED, EXTENDED RELEASE ORAL DAILY
Status: DISCONTINUED | OUTPATIENT
Start: 2018-08-09 | End: 2018-08-10

## 2018-08-09 RX ORDER — ASPIRIN 81 MG/1
81 TABLET ORAL DAILY
Status: DISCONTINUED | OUTPATIENT
Start: 2018-08-09 | End: 2018-08-10

## 2018-08-09 RX ORDER — VITAMIN E 268 MG
400 CAPSULE ORAL DAILY
Status: DISCONTINUED | OUTPATIENT
Start: 2018-08-09 | End: 2018-08-14 | Stop reason: HOSPADM

## 2018-08-09 RX ORDER — ONDANSETRON 2 MG/ML
4 INJECTION INTRAMUSCULAR; INTRAVENOUS EVERY 6 HOURS PRN
Status: DISCONTINUED | OUTPATIENT
Start: 2018-08-09 | End: 2018-08-14 | Stop reason: HOSPADM

## 2018-08-09 RX ORDER — FONDAPARINUX SODIUM 2.5 MG/.5ML
2.5 INJECTION SUBCUTANEOUS DAILY
Status: DISCONTINUED | OUTPATIENT
Start: 2018-08-09 | End: 2018-08-10

## 2018-08-09 RX ORDER — ACETAMINOPHEN 325 MG/1
650 TABLET ORAL EVERY 6 HOURS PRN
Status: DISCONTINUED | OUTPATIENT
Start: 2018-08-09 | End: 2018-08-14 | Stop reason: HOSPADM

## 2018-08-09 RX ORDER — LIDOCAINE 50 MG/G
1 PATCH TOPICAL DAILY
Status: DISCONTINUED | OUTPATIENT
Start: 2018-08-09 | End: 2018-08-14 | Stop reason: HOSPADM

## 2018-08-09 RX ADMIN — FONDAPARINUX SODIUM 2.5 MG: 2.5 INJECTION, SOLUTION SUBCUTANEOUS at 14:55

## 2018-08-09 RX ADMIN — ONDANSETRON 4 MG: 2 INJECTION INTRAMUSCULAR; INTRAVENOUS at 20:29

## 2018-08-09 RX ADMIN — DILTIAZEM HYDROCHLORIDE 180 MG: 180 CAPSULE, COATED, EXTENDED RELEASE ORAL at 17:22

## 2018-08-09 RX ADMIN — Medication 100 MG: at 14:55

## 2018-08-09 RX ADMIN — VITAMIN E CAP 400 UNIT 400 UNITS: 400 CAP at 14:55

## 2018-08-09 RX ADMIN — MELATONIN TAB 3 MG 6 MG: 3 TAB at 22:37

## 2018-08-09 RX ADMIN — ASPIRIN 81 MG: 81 TABLET, COATED ORAL at 14:55

## 2018-08-09 RX ADMIN — FUROSEMIDE 40 MG: 10 INJECTION, SOLUTION INTRAMUSCULAR; INTRAVENOUS at 11:54

## 2018-08-09 RX ADMIN — DILTIAZEM HYDROCHLORIDE 5 MG/HR: 5 INJECTION INTRAVENOUS at 12:32

## 2018-08-09 RX ADMIN — Medication 100 MG: at 17:23

## 2018-08-09 RX ADMIN — FUROSEMIDE 40 MG: 10 INJECTION, SOLUTION INTRAMUSCULAR; INTRAVENOUS at 14:55

## 2018-08-09 RX ADMIN — LIDOCAINE 1 PATCH: 50 PATCH CUTANEOUS at 13:14

## 2018-08-09 RX ADMIN — POTASSIUM CHLORIDE 20 MEQ: 1500 TABLET, EXTENDED RELEASE ORAL at 14:55

## 2018-08-09 RX ADMIN — DILTIAZEM HYDROCHLORIDE 10 MG: 5 INJECTION INTRAVENOUS at 11:18

## 2018-08-09 NOTE — ED PROVIDER NOTES
History  Chief Complaint   Patient presents with    Shortness of Breath     Started approximately 3 days ago  HPI     62-year-old male with history of atrial fibrillation and new onset heart failure presents for dyspnea  Has been waxing waning over the past few months  Worse over the past three days  Reports feeling lightheaded  Has been following with Cardiology but does not take any rate control due to problems with synthetic drugs  Denies chest pain nausea vomiting or diarrhea  Called his cardiologist today and he told him to come in to get evaluated  On arrival the patient was tachycardic to 140 within normal blood pressure  He is mentating normally  No other modifying factors or associated symptoms  Moderate severity  Assessment plan:  AFib with rapid ventricular response with likely fluid overload from congestive heart failure  Will gently diurese and attempt rate control given that the patient is not on anticoagulation and is not candidate for cardioversion  Prior to Admission Medications   Prescriptions Last Dose Informant Patient Reported? Taking?    [place] 150 MG CAPS 8/8/2018 at Unknown time  Yes Yes   Sig: Take by mouth   Iron-Vitamins (GERITOL COMPLETE PO) 8/8/2018 at Unknown time Spouse/Significant Other Yes Yes   Sig: Take by mouth   Thiamine HCl (VITAMIN B-1) 100 MG TABS 8/8/2018 at Unknown time  Yes Yes   Sig: Take 1 tablet by mouth daily   Zinc 100 MG TABS 8/8/2018 at Unknown time  Yes Yes   Sig: Take by mouth   ascorbic acid with irene hips 1000 MG tablet 8/8/2018 at Unknown time  Yes Yes   Sig: Take 500 mg by mouth daily     aspirin (ECOTRIN LOW STRENGTH) 81 mg EC tablet 8/8/2018 at Unknown time  Yes Yes   Sig: Take 1 tablet by mouth daily   benzonatate (TESSALON PERLES) 100 mg capsule Unknown at Unknown time  No No   Sig: Take 1 capsule (100 mg total) by mouth 3 (three) times a day as needed for cough   furosemide (LASIX) 20 mg tablet 8/9/2018 at 4:30 am  No Yes   Sig: Take 1 tablet (20 mg total) by mouth every other day   potassium chloride (MICRO-K) 10 MEQ CR capsule 8/9/2018 at Unknown time  No Yes   Sig: Take 1 capsule (10 mEq total) by mouth every other day      Facility-Administered Medications: None       Past Medical History:   Diagnosis Date    Allergic rhinitis     last assessed [id number]    Atrial fibrillation ([facility] )     last assessed [id number]    Atypical chest pain     last assessed [id number]    Closed fracture of pubis ([facility] )     Community acquired pneumonia     last assessed [id number]    COPD (chronic obstructive pulmonary disease) ([facility] )     Nodule of right lung     last assessed [id number]    Pleural effusion     last assessed [id number]    Polyuria     last assessed [id number]    Traumatic pneumothorax        Past Surgical History:   Procedure Laterality Date    HERNIA REPAIR      TOOTH EXTRACTION         Family History   Problem Relation Age of Onset    Diabetes Father      I have reviewed and agree with the history as documented  Social History   Substance Use Topics    Smoking status: Former Smoker     Types: Pipe, Cigars     Quit date: 1996    Smokeless tobacco: Never Used    Alcohol use No        Review of Systems    Physical Exam  Physical Exam   Constitutional: He is oriented to person, place, and time  He appears well-developed  HENT:   Head: Normocephalic and atraumatic  Right Ear: External ear normal    Left Ear: External ear normal    Mouth/Throat: Oropharynx is clear and moist    Eyes: Conjunctivae and EOM are normal  Pupils are equal, round, and reactive to light  Neck: Normal range of motion  Neck supple  No JVD present  No thyromegaly present  Cardiovascular: Regular rhythm and normal heart sounds  Exam reveals no gallop and no friction rub  No murmur heard  Irregular irregular tachycardia, Slight JVD   Pulmonary/Chest: Effort normal and breath sounds normal  No respiratory distress  He has no wheezes  He has no rales  Abdominal: Soft  Bowel sounds are normal  He exhibits no distension  There is no rebound and no guarding  Musculoskeletal: Normal range of motion  He exhibits no edema  Lymphadenopathy:     He has no cervical adenopathy  Neurological: He is alert and oriented to person, place, and time  No cranial nerve deficit  Skin: Skin is warm  Psychiatric: He has a normal mood and affect  His behavior is normal    Nursing note and vitals reviewed        Vital Signs  ED Triage Vitals   Temperature Pulse Respirations Blood Pressure SpO2   08/09/18 1046 08/09/18 1046 08/09/18 1046 08/09/18 1046 08/09/18 1046   97 8 °F (36 6 °C) 61 16 146/78 95 %      Temp Source Heart Rate Source Patient Position - Orthostatic VS BP Location FiO2 (%)   08/09/18 1046 08/09/18 1135 08/09/18 1046 08/09/18 1046 --   Oral Monitor Sitting Right arm       Pain Score       08/09/18 1046       9           Vitals:    08/09/18 1901 08/09/18 1915 08/09/18 2251 08/10/18 0300   BP: (!) 81/61 96/60 104/86 97/75   Pulse: 55  72 82   Patient Position - Orthostatic VS: Sitting Sitting Lying Sitting       Visual Acuity      ED Medications  Medications   diltiazem (CARDIZEM) 125 mg in sodium chloride 0 9 % 125 mL infusion (12 5 mg/hr Intravenous Continue to Inpatient Floor 8/9/18 1450)   furosemide (LASIX) injection 40 mg (40 mg Intravenous Given 8/9/18 1455)   aspirin (ECOTRIN LOW STRENGTH) EC tablet 81 mg (81 mg Oral Given 8/9/18 1455)   benzonatate (TESSALON PERLES) capsule 100 mg (not administered)   thiamine (VITAMIN B1) tablet 100 mg (100 mg Oral Given 8/9/18 1455)   zinc gluconate tablet 100 mg (100 mg Oral Given 8/9/18 1723)   potassium chloride (K-DUR,KLOR-CON) CR tablet 20 mEq (20 mEq Oral Given 8/9/18 1455)   ondansetron (ZOFRAN) injection 4 mg (4 mg Intravenous Given 8/9/18 2029)   fondaparinux (ARIXTRA) subcutaneous injection 2 5 mg (2 5 mg Subcutaneous Given 8/9/18 1455)   acetaminophen (TYLENOL) tablet 650 mg (not administered)   vitamin E (tocopherol) capsule 400 Units (400 Units Oral Given 8/9/18 1455)   LORazepam (ATIVAN) 2 mg/mL injection 0 5 mg (not administered)   lidocaine (LIDODERM) 5 % patch 1 patch (1 patch Transdermal Patch Removed 8/10/18 0038)   diltiazem (CARDIZEM CD) 24 hr capsule 180 mg (180 mg Oral Given 8/9/18 1722)   melatonin tablet 6 mg (6 mg Oral Given 8/9/18 2237)   diltiazem (CARDIZEM) injection 10 mg (10 mg Intravenous Given 8/9/18 1118)   furosemide (LASIX) injection 40 mg (40 mg Intravenous Given 8/9/18 1154)   diltiazem (CARDIZEM) 125 mg in sodium chloride 0 9 % 125 mL infusion (0 mg/hr Intravenous Hold 8/9/18 2000)       Diagnostic Studies  Results Reviewed     Procedure Component Value Units Date/Time    Comprehensive metabolic panel [[id number]]  (Abnormal) Collected:  08/09/18 1104    Lab Status:  Final result Specimen:  Blood from Arm, Left Updated:  08/09/18 1125     Sodium 139 mmol/L      Potassium 3 7 mmol/L      Chloride 104 mmol/L      CO2 30 mmol/L      Anion Gap 5 mmol/L      BUN 24 mg/dL      Creatinine 1 32 (H) mg/dL      Glucose 151 (H) mg/dL      Calcium 8 7 mg/dL      AST 33 U/L      ALT 46 U/L      Alkaline Phosphatase 70 U/L      Total Protein 7 2 g/dL      Albumin 3 4 (L) g/dL      Total Bilirubin 0 97 mg/dL      eGFR 52 ml/min/1 73sq m     Narrative:         National Kidney Disease Education Program recommendations are as follows:  GFR calculation is accurate only with a steady state creatinine  Chronic Kidney disease less than 60 ml/min/1 73 sq  meters  Kidney failure less than 15 ml/min/1 73 sq  meters      Troponin I [[id number]]  (Normal) Collected:  08/09/18 1104    Lab Status:  Final result Specimen:  Blood from Arm, Left Updated:  08/09/18 1125     Troponin I 0 03 ng/mL     CBC and differential [[id number]]  (Abnormal) Collected:  08/09/18 1104    Lab Status:  Final result Specimen:  Blood from Arm, Left Updated:  08/09/18 1114     WBC 8 68 Thousand/uL      RBC 4 49 Million/uL      Hemoglobin 15 0 g/dL Hematocrit 44 9 %       (H) fL      MCH 33 4 pg      MCHC 33 4 g/dL      RDW 14 2 %      MPV 12 1 fL      Platelets 193 (L) Thousands/uL      nRBC 0 /100 WBCs      Neutrophils Relative 69 %      Lymphocytes Relative 24 %      Monocytes Relative 7 %      Eosinophils Relative 1 %      Basophils Relative 0 %      Neutrophils Absolute 5 98 Thousands/µL      Lymphocytes Absolute 2 04 Thousands/µL      Monocytes Absolute 0 57 Thousand/µL      Eosinophils Absolute 0 07 Thousand/µL      Basophils Absolute 0 02 Thousands/µL                  XR chest portable   Final Result by Paris Guidry MD (08/09 1136)      Small right basilar atelectasis/infiltrate  Trace bilateral pleural effusions  Workstation performed: [id number]                    Procedures  CriticalCare Time  Performed by: Jordan Painter by: Vitaly Fernandez     Critical care provider statement:     Critical care time (minutes):  40    Critical care time was exclusive of:  Separately billable procedures and treating other patients and teaching time    Critical care was necessary to treat or prevent imminent or life-threatening deterioration of the following conditions:  Cardiac failure and respiratory failure    Critical care was time spent personally by me on the following activities:  Blood draw for specimens, obtaining history from patient or surrogate, re-evaluation of patient's condition, review of old charts, evaluation of patient's response to treatment, examination of patient, ordering and review of laboratory studies, ordering and performing treatments and interventions, development of treatment plan with patient or surrogate and ordering and review of radiographic studies  Comments:       Critical care time for frequent monitoring on Cardizem drip as well as monitoring for respiratory status given heart failure in the setting of arrhythmia    ECG 12 Lead Documentation  Date/Time: 8/9/2018 11:15 AM  Performed by: Mikey Stephen JENISE RENNER  Authorized by: Vitaly Fernandez     ECG reviewed by me, the ED Provider: yes    Patient location:  ED  Previous ECG:     Previous ECG:  Compared to current    Similarity:  No change  Interpretation:     Interpretation: abnormal    Rate:     ECG rate assessment: tachycardic    Rhythm:     Rhythm: atrial fibrillation    Ectopy:     Ectopy: none    QRS:     QRS axis:  Normal  Conduction:     Conduction: normal    ST segments:     ST segments:  Normal  T waves:     T waves: normal             Phone Contacts  ED Phone Contact    ED Course                               MDM  Number of Diagnoses or Management Options  Atrial fibrillation, rapid [facility]): new and requires workup  CHF (congestive heart failure) ([facility] ): new and requires workup  Diagnosis management comments:  Patient rate controlled on Cardizem  Lasix provided chest x-ray consistent with mild fluid overloaded    Admit to Medicine  With  telemetry       Amount and/or Complexity of Data Reviewed  Clinical lab tests: reviewed and ordered  Tests in the radiology section of CPT®: reviewed and ordered  Tests in the medicine section of CPT®: ordered and reviewed  Independent visualization of images, tracings, or specimens: yes    Patient Progress  Patient progress: stable    CritCare Time    Disposition  Final diagnoses:   Atrial fibrillation, rapid ([facility] )   CHF (congestive heart failure) ([facility] )     Time reflects when diagnosis was documented in both MDM as applicable and the Disposition within this note     Time User Action Codes Description Comment    8/9/2018 12:06 PM Carmel Perez Add [I48 91] Atrial fibrillation, rapid ([facility] )     8/9/2018 12:07 PM Carmel Perez Add [I50 9] CHF (congestive heart failure) [facility])       ED Disposition     ED Disposition Condition Comment    Admit  Case was discussed with Za and the patient's admission status was agreed to be Admission Status: inpatient status to the service of Dr Shad Chapman None         Current Discharge Medication List      CONTINUE these medications which have NOT CHANGED    Details   ascorbic acid with irene hips 1000 MG tablet Take 500 mg by mouth daily        aspirin (ECOTRIN LOW STRENGTH) 81 mg EC tablet Take 1 tablet by mouth daily      furosemide (LASIX) 20 mg tablet Take 1 tablet (20 mg total) by mouth every other day  Qty: 30 tablet, Refills: 0    Associated Diagnoses: Chronic cough; Atrial fibrillation, unspecified type (HCC)      [place] 150 MG CAPS Take by mouth      Iron-Vitamins (GERITOL COMPLETE PO) Take by mouth      potassium chloride (MICRO-K) 10 MEQ CR capsule Take 1 capsule (10 mEq total) by mouth every other day  Qty: 30 capsule, Refills: 0    Associated Diagnoses: Chronic cough; Atrial fibrillation, unspecified type (HCC)      Thiamine HCl (VITAMIN B-1) 100 MG TABS Take 1 tablet by mouth daily      Zinc 100 MG TABS Take by mouth      benzonatate (TESSALON PERLES) 100 mg capsule Take 1 capsule (100 mg total) by mouth 3 (three) times a day as needed for cough  Qty: 20 capsule, Refills: 2    Associated Diagnoses: Chronic cough           No discharge procedures on file      ED Provider  Electronically Signed by           Efra Seth DO  08/10/18 3722

## 2018-08-09 NOTE — TELEPHONE ENCOUNTER
pc from [facility] caretaker of Mr Ghazala Alexander he was seen by pulmonary Dr Krista Rg in [facility] who felt the patient should call cardiology   blood pressure running 166-102 /   Heart rate 101-67   Weight up to 131 lbs (at [facility] office yesterday wt 137 (fully clothed) /82  Caretaker states pt has difficultly breathing and edema in legs by end of day gone in AM   Noted he has bulging carotids per caretaker  Willing to take to ED if you say to go  Please advise   the patient is taking lasix daily

## 2018-08-09 NOTE — H&P
H&P Exam - Diana Nicholas 70 y o  male MRN: [id number]    Unit/Bed#: E4 -01 Encounter: [id number]      Assessment/Plan      1-atrial fibrillation with rapid ventricular rate-the patient presented with palpitations and heart rate of around 140  With a diltiazem drip his heart rate is down between 100 and 110  Patient has not responded well to diltiazem and digoxin in the past however he has happened able to trying diltiazem again this time  Echocardiogram has been ordered  Patient has refused anticoagulation in the past but ongoing discussions are being held with the patient to  try Eliquis  2-acute diastolic heart failure-patient states his symptoms have improved with IV diuresis  This will be continued at 40 mg daily  Will continue to monitor input output and daily weights  As mentioned previously her cardiogram has been ordered  3-COPD without exacerbation-continue home regimen  4-anxiety-the patient states that he gets more anxious when admitted to hospital and requests that he be on an anti anxiolytic     5-acute renal failure-likely secondary to diuretic use  Baseline creatinine is around 1 07   Current creatinine around 1 32  Will continue to monitor this closely  DVT prophylaxis Lovenox     The patient will be admitted with an inpatient status and I expect a more than 2 midnight stay for this patient  History of Present Illness     HPI:  Diana Nicholas is a 70 y o  male who was sent in by his cardiologist worsening shortness of breath and leg swelling and a 10 lb weight gain  He has a history of paroxysmal atrial fibrillation-and has done poorly on digoxin and diltiazem  He is currently on Emelle 150 mg daily  The patient had refused anticoagulation in the past   In March of this year he developed persistent cough which was felt to be viral   Subsequently he developed orthopnea and a diagnosis of CHF was made based on x-ray and elevated BNP    Once he was started on diuresis his symptoms improved  In the ED was noted to be in AFib with rapid ventricular rate and evidence of congestive heart failure  He was started on a diltiazem drip with which his heart rate has trended down to around 100 to 120  He also complains of a lot of anxiety and worsening restless leg  In addition patient also complains of persistent rib pain on the right side-x-ray did not reveal any fractures  He denies any chest pain        Historical Information   Past Medical History:   Diagnosis Date    Allergic rhinitis     last assessed 02Oct2015    Atrial fibrillation ([facility] [address] [facility]     last assessed 22Apr2013    Atypical chest pain     last assessed 07Apr2016    Closed fracture of pubis [facility] [address] [facility]     Community acquired pneumonia     last assessed [id number]    [facility]     Nodule of right lung     last assessed 11Jun2014    Pleural effusion     last assessed [id number]    Polyuria     last assessed [id number]    Traumatic pneumothorax      Patient Active Problem List   Diagnosis    Atrial fibrillation (HCC)    Chronic obstructive pulmonary disease (HCC)    Pain of right lower leg    Rib pain on right side    Nodule of left lung    Chronic cough    Anxiety    Acute congestive heart failure (HCC)    Atrial fibrillation with rapid ventricular response (HCC)    Acute diastolic heart failure (HCC)    Restless leg syndrome, uncontrolled     Past Surgical History:   Procedure Laterality Date    HERNIA REPAIR      TOOTH EXTRACTION         Social History   History   Alcohol Use No     History   Drug Use No     History   Smoking Status    Former Smoker    Types: Pipe, Cigars    Quit date: 1996   Smokeless Tobacco    Never Used       Family History:   Family History   Problem Relation Age of Onset    Diabetes Father        Meds/Allergies       Current Facility-Administered Medications:     acetaminophen (TYLENOL) tablet 650 mg, 650 mg, Oral, Q6H PRN, Neris Talbert MD Za    aspirin (ECOTRIN LOW STRENGTH) EC tablet 81 mg, 81 mg, Oral, Daily, Reese Dorantes MD, 81 mg at 08/09/18 1455    benzonatate (TESSALON PERLES) capsule 100 mg, 100 mg, Oral, TID PRN, Reese Dorantes MD    diltiazem (CARDIZEM CD) 24 hr capsule 180 mg, 180 mg, Oral, Daily, Catina Hull DO    diltiazem (CARDIZEM) 125 mg in sodium chloride 0 9 % 125 mL infusion, 1-15 mg/hr, Intravenous, Once, Reese Dorantes MD    fondaparinux (ARIXTRA) subcutaneous injection 2 5 mg, 2 5 mg, Subcutaneous, Daily, Reese Dorantes MD, 2 5 mg at 08/09/18 1455    furosemide (LASIX) injection 40 mg, 40 mg, Intravenous, Daily, Reese Dorantes MD, 40 mg at 08/09/18 1455    lidocaine (LIDODERM) 5 % patch 1 patch, 1 patch, Transdermal, Daily, Reese Dorantes MD, 1 patch at 08/09/18 1314    LORazepam (ATIVAN) 2 mg/mL injection 0 5 mg, 0 5 mg, Intravenous, Q4H PRN, Reese Dorantes MD    ondansetron (ZOFRAN) injection 4 mg, 4 mg, Intravenous, Q6H PRN, Reese Dorantes MD    potassium chloride (K-DUR,KLOR-CON) CR tablet 20 mEq, 20 mEq, Oral, Daily, Paige Mendenhall MD, 20 mEq at 08/09/18 1455    thiamine (VITAMIN B1) tablet 100 mg, 100 mg, Oral, Daily, Reese Dorantes MD, 100 mg at 08/09/18 1455    vitamin E (tocopherol) capsule 400 Units, 400 Units, Oral, Daily, Reese Dorantes MD, 400 Units at 08/09/18 1455    zinc gluconate tablet 100 mg, 100 mg, Oral, Daily, Reese Dorantes MD    Allergies   Allergen Reactions    Anoro Ellipta [Umeclidinium-Vilanterol] Nausea Only     Heart burn, anal drainage    Origanum Oil        Review of Systems  A detailed 12 point review of systems was conducted and is negative apart from those mentioned in the HPI  Objective   Vitals: Blood pressure 120/69, pulse 103, temperature 97 6 °F (36 4 °C), temperature source Tympanic, resp  rate 18, height 6' (1 829 m), weight 58 6 kg (129 lb 3 oz), SpO2 97 %      Physical Exam     General-lying comfortably in bed but anxious  Pulsatile JVD noted  HEENT: PERRLA, EOMI, sclera anicteric, dry mucous membranes, tongue mucosa dry without lesions  Neck: supple, no JVD, lymphadenopathy, thyromegaly  Heart: iregular rate and rhythm, S1S2 present  No S3 your parasternal heave No murmur, rub or gallop  Lungs; Clear to auscultation bilaterally  Scattered bibasilar rales  Abdomen: soft, non-tender, non-distended, NABS  No guarding or rebound  No peritoneal sound or mass  Extremities: no clubbing, cyanosis, or edema  2+ pedal pulses bilaterally  Full range of motion  Neurologic:  Cranial nerves II-XII intact  Strength and sensation globally intact  Speech fluent and goal directed  Awake, alert and oriented x 3  Skin: warm and dry  No petechiae, purpura or rash  Lab Results: I have personally reviewed pertinent reports  Results from last 7 days  Lab Units 08/09/18  1104   WBC Thousand/uL 8 68   HEMOGLOBIN g/dL 15 0   HEMATOCRIT % 44 9   PLATELETS Thousands/uL 125*   NEUTROS PCT % 69   LYMPHS PCT % 24   MONOS PCT % 7   EOS PCT % 1       Results from last 7 days  Lab Units 08/09/18  1104   SODIUM mmol/L 139   POTASSIUM mmol/L 3 7   CHLORIDE mmol/L 104   CO2 mmol/L 30   BUN mg/dL 24   CREATININE mg/dL 1 32*   CALCIUM mg/dL 8 7   TOTAL PROTEIN g/dL 7 2   BILIRUBIN TOTAL mg/dL 0 97   ALK PHOS U/L 70   ALT U/L 46   AST U/L 33   GLUCOSE RANDOM mg/dL 151*         Imaging:  X-ray chest pulmonary vascular congestion    EKG-AFib with RVR    Code Status: Level 1 - Full Code      Counseling / Coordination of Care  Total floor / unit time spent today 22 minutes  Greater than 50% of total time was spent with the patient and / or family counseling and / or coordination of care  Portions of the record may have been created with voice recognition software  Occasional wrong word or "sound a like" substitutions may have occurred due to the inherent limitations of voice recognition software   Read the chart carefully and recognize, using context, where substitutions have occurred

## 2018-08-10 ENCOUNTER — APPOINTMENT (INPATIENT)
Dept: NON INVASIVE DIAGNOSTICS | Facility: HOSPITAL | Age: 78
DRG: 308 | End: 2018-08-10
Payer: COMMERCIAL

## 2018-08-10 LAB
ANION GAP SERPL CALCULATED.3IONS-SCNC: 6 MMOL/L (ref 4–13)
BUN SERPL-MCNC: 27 MG/DL (ref 5–25)
CALCIUM SERPL-MCNC: 8.5 MG/DL (ref 8.3–10.1)
CHLORIDE SERPL-SCNC: 101 MMOL/L (ref 100–108)
CHOLEST SERPL-MCNC: 99 MG/DL (ref 50–200)
CO2 SERPL-SCNC: 27 MMOL/L (ref 21–32)
CREAT SERPL-MCNC: 1.45 MG/DL (ref 0.6–1.3)
FERRITIN SERPL-MCNC: 57 NG/ML (ref 8–388)
GFR SERPL CREATININE-BSD FRML MDRD: 46 ML/MIN/1.73SQ M
GLUCOSE SERPL-MCNC: 113 MG/DL (ref 65–140)
HDLC SERPL-MCNC: 34 MG/DL (ref 40–60)
LDLC SERPL CALC-MCNC: 48 MG/DL (ref 0–100)
NONHDLC SERPL-MCNC: 65 MG/DL
NT-PROBNP SERPL-MCNC: 2373 PG/ML
POTASSIUM SERPL-SCNC: 4.5 MMOL/L (ref 3.5–5.3)
SODIUM SERPL-SCNC: 134 MMOL/L (ref 136–145)
TRIGL SERPL-MCNC: 83 MG/DL

## 2018-08-10 PROCEDURE — G8978 MOBILITY CURRENT STATUS: HCPCS

## 2018-08-10 PROCEDURE — 80048 BASIC METABOLIC PNL TOTAL CA: CPT | Performed by: HOSPITALIST

## 2018-08-10 PROCEDURE — 97163 PT EVAL HIGH COMPLEX 45 MIN: CPT

## 2018-08-10 PROCEDURE — 99232 SBSQ HOSP IP/OBS MODERATE 35: CPT | Performed by: HOSPITALIST

## 2018-08-10 PROCEDURE — G8979 MOBILITY GOAL STATUS: HCPCS

## 2018-08-10 PROCEDURE — 80061 LIPID PANEL: CPT | Performed by: HOSPITALIST

## 2018-08-10 PROCEDURE — 93306 TTE W/DOPPLER COMPLETE: CPT | Performed by: INTERNAL MEDICINE

## 2018-08-10 PROCEDURE — 82728 ASSAY OF FERRITIN: CPT | Performed by: HOSPITALIST

## 2018-08-10 PROCEDURE — G8980 MOBILITY D/C STATUS: HCPCS

## 2018-08-10 PROCEDURE — 83880 ASSAY OF NATRIURETIC PEPTIDE: CPT | Performed by: HOSPITALIST

## 2018-08-10 PROCEDURE — 99232 SBSQ HOSP IP/OBS MODERATE 35: CPT | Performed by: INTERNAL MEDICINE

## 2018-08-10 PROCEDURE — 93306 TTE W/DOPPLER COMPLETE: CPT

## 2018-08-10 RX ADMIN — ASPIRIN 81 MG: 81 TABLET, COATED ORAL at 08:36

## 2018-08-10 RX ADMIN — LIDOCAINE 1 PATCH: 50 PATCH CUTANEOUS at 18:04

## 2018-08-10 RX ADMIN — Medication 100 MG: at 08:36

## 2018-08-10 RX ADMIN — POTASSIUM CHLORIDE 20 MEQ: 1500 TABLET, EXTENDED RELEASE ORAL at 08:36

## 2018-08-10 RX ADMIN — FONDAPARINUX SODIUM 2.5 MG: 2.5 INJECTION, SOLUTION SUBCUTANEOUS at 08:36

## 2018-08-10 RX ADMIN — VITAMIN E CAP 400 UNIT 400 UNITS: 400 CAP at 08:36

## 2018-08-10 RX ADMIN — APIXABAN 2.5 MG: 2.5 TABLET, FILM COATED ORAL at 17:38

## 2018-08-10 RX ADMIN — DILTIAZEM HYDROCHLORIDE 180 MG: 180 CAPSULE, COATED, EXTENDED RELEASE ORAL at 08:36

## 2018-08-10 RX ADMIN — MELATONIN TAB 3 MG 6 MG: 3 TAB at 21:33

## 2018-08-10 RX ADMIN — FUROSEMIDE 40 MG: 10 INJECTION, SOLUTION INTRAMUSCULAR; INTRAVENOUS at 08:36

## 2018-08-10 NOTE — PROGRESS NOTES
Progress Note - Ravi De La Paz 70 y o  male MRN: [id number]    Unit/Bed#: E4 -01 Encounter: [id number]    Principal Problem:    Restless leg syndrome, uncontrolled  Active Problems:    Chronic obstructive pulmonary disease (HCC)    Rib pain on right side    Anxiety    Atrial fibrillation with rapid ventricular response (HCC)    Acute diastolic heart failure (HCC)      Assessment/Plan:    1-atrial fibrillation with rapid ventricular rate-the patient presented with palpitations and heart rate of around 140  This is now rate controlled     Echocardiogram has been ordered  Patient on oral diltiazem  Patient has refused anticoagulation in the past but ongoing discussions are being held with the patient to  try Eliquis      2-acute diastolic heart failure-patient states his symptoms have improved with IV diuresis  This will be continued at 40 mg for now  Will defer to Cardiology on what dose of diuretics he goes home on     Will continue to monitor input output and daily weights       3-COPD without exacerbation-continue home regimen      4-anxiety-the patient states that he gets more anxious when admitted to hospital and requests that he be on an anti anxiolytic      5-acute renal failure-likely secondary to diuretic use  Baseline creatinine is around 1 07   Current creatinine around 1 42  Will continue to monitor this closely      DVT prophylaxis Lovenox     Anticipate discharge to home in 24-48 hours  Subjective:  Patient states he feels better  AFib is now rate controlled  No shortness of breath  Patient anticipating discharge home today after Cardiology sees him  Physical Exam:   Vitals: Blood pressure 118/69, pulse 105, temperature 98 2 °F (36 8 °C), resp  rate 18, height 6' (1 829 m), weight 58 9 kg (129 lb 13 6 oz), SpO2 92 %  ,Body mass index is 17 61 kg/m²  Gen:  Pleasant, non-tachypnic, non-dyspnic  Conversant    Heart: regular rate and rhythm, S1S2 present, no murmur, rub or gallop  Lungs: clear to ausculatation bilaterally  No wheezing, crackless, or rhonchi  No accessory muscle use or respiratory distress  Abd: soft, non-tender, non-distended  NABS, no guarding, rebound or peritoneal signs  Extremities: no clubbing, cyanosis or edema  2+pedal pulses bilaterally  Full range of motion  Neuro: awake, alert and oriented  Cranial nerves 2-12 intact  Strength and sensation grossly intact  Skin: warm and dry: no petechiae, purpura and rash      LABS:     Results from last 7 days  Lab Units 08/09/18  1104   WBC Thousand/uL 8 68   HEMOGLOBIN g/dL 15 0   HEMATOCRIT % 44 9   PLATELETS Thousands/uL 125*       Results from last 7 days  Lab Units 08/10/18  0426 08/09/18  1104   SODIUM mmol/L 134* 139   POTASSIUM mmol/L 4 5 3 7   CHLORIDE mmol/L 101 104   CO2 mmol/L 27 30   BUN mg/dL 27* 24   CREATININE mg/dL 1 45* 1 32*   GLUCOSE RANDOM mg/dL 113 151*   CALCIUM mg/dL 8 5 8 7       Intake/Output Summary (Last 24 hours) at 08/10/18 0841  Last data filed at 08/09/18 1806   Gross per 24 hour   Intake              100 ml   Output                0 ml   Net              100 ml           Current Facility-Administered Medications:  acetaminophen 650 mg Oral Q6H PRN Lisa Marroquin MD   aspirin 81 mg Oral Daily Paige Mendenhall MD   benzonatate 100 mg Oral TID PRN Lisa Marroquin MD   diltiazem 180 mg Oral Daily Rujul Hull, DO   diltiazem 1-15 mg/hr Intravenous Once Lisaelliot Marroquin MD   fondaparinux 2 5 mg Subcutaneous Daily Paige Mendenhall MD   furosemide 40 mg Intravenous Daily Lisa MD Cara   lidocaine 1 patch Transdermal Daily Lisa MD Cara   LORazepam 0 5 mg Intravenous Q4H PRN Lisaelliot Marroquin MD   melatonin 6 mg Oral HS PRN Loyal Salles Spatzer, CRNP   ondansetron 4 mg Intravenous Q6H PRN Lisa MD Cara   potassium chloride 20 mEq Oral Daily Lisa Marroquin MD   vitamin B-1 100 mg Oral Daily Lisa MD Cara   vitamin E (tocopherol) 400 Units Oral Daily Paige MD aZ   zinc gluconate 100 mg Oral Daily Moe Ramirez MD

## 2018-08-10 NOTE — PROGRESS NOTES
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  3  Acute systolic/diastolic congestive heart failure  4  Severe tricuspid regurgitation with moderate right ventricular dilatation    · Discontinue diltiazem  Will start metoprolol tartrate 25 mg p  O  b i d  tomorrow morning if systolic blood pressure greater than 100 mm Hg  · Will resume p o  Lasix tomorrow if blood pressure tolerates metoprolol  · Reviewed indications, risks, benefits of anticoagulation  He and his wife are strictly against taking warfarin    They are agreeable to start Eliquis for stroke prevention        Subjective:  Patient seen and examined, feels better, less short of breath      Physical Exam:  GEN:  NAD  HEENT:  MMM, NCAT, pink conjunctiva, EOMI, nonicteric sclera  CV:  ++JVD/HJR, irregularly irregular, NO M/R/G, +S1/S2, NO PARASTERNAL HEAVE/THRILL, NO LE EDEMA, +HEPATIC SYSTOLIC PULSATION, WARM EXTREMITIES  RESP:  CTAB/L, distant breath sounds  ABD:  SOFT, NT, NO GROSS ORGANOMEGALY        Vitals:   BP (!) 88/66 (BP Location: Right arm)   Pulse 66   Temp 97 9 °F (36 6 °C) (Tympanic)   Resp 18   Ht 6' (1 829 m)   Wt 58 9 kg (129 lb 13 6 oz)   SpO2 95%   BMI 17 61 kg/m²   Vitals:    08/10/18 0300 08/10/18 0559   Weight: 59 kg (130 lb) 58 9 kg (129 lb 13 6 oz)       Intake/Output Summary (Last 24 hours) at 08/10/18 1717  Last data filed at 08/09/18 1806   Gross per 24 hour   Intake              100 ml   Output                0 ml   Net              100 ml       TELEMETRY:  Atrial fibrillation, rate controlled  Lab Results:    Results from last 7 days  Lab Units 08/09/18  1104   WBC Thousand/uL 8 68   HEMOGLOBIN g/dL 15 0   HEMATOCRIT % 44 9   PLATELETS Thousands/uL 125*       Results from last 7 days  Lab Units 08/10/18  0426 08/09/18  1104   SODIUM mmol/L 134* 139   POTASSIUM mmol/L 4 5 3 7   CHLORIDE mmol/L 101 104   CO2 mmol/L 27 30   BUN mg/dL 27* 24   CREATININE mg/dL 1 45* 1 32*   CALCIUM mg/dL 8 5 8 7   TOTAL PROTEIN g/dL  --  7 2   BILIRUBIN TOTAL mg/dL  --  0 97   ALK PHOS U/L  --  70   ALT U/L  --  46   AST U/L  --  33   GLUCOSE RANDOM mg/dL 113 151*       Results from last 7 days  Lab Units 08/10/18  0426   SODIUM mmol/L 134*   POTASSIUM mmol/L 4 5   CHLORIDE mmol/L 101   CO2 mmol/L 27   BUN mg/dL 27*   CREATININE mg/dL 1 45*   GLUCOSE RANDOM mg/dL 113   CALCIUM mg/dL 8 5       Results from last 7 days  Lab Units 08/10/18  0426   CHOLESTEROL mg/dL 99       Medications:    Current Facility-Administered Medications:     acetaminophen (TYLENOL) tablet 650 mg, 650 mg, Oral, Q6H PRN, Vlad Diop MD    aspirin (ECOTRIN LOW STRENGTH) EC tablet 81 mg, 81 mg, Oral, Daily, Vlad Diop MD, 81 mg at 08/10/18 0836    benzonatate (TESSALON PERLES) capsule 100 mg, 100 mg, Oral, TID PRN, Vlad Diop MD    diltiazem (CARDIZEM) 125 mg in sodium chloride 0 9 % 125 mL infusion, 1-15 mg/hr, Intravenous, Once, Vlad Diop MD    fondaparinux (ARIXTRA) subcutaneous injection 2 5 mg, 2 5 mg, Subcutaneous, Daily, Vlad Diop MD, 2 5 mg at 08/10/18 0836    furosemide (LASIX) injection 40 mg, 40 mg, Intravenous, Daily, Paige Mendenhall MD, 40 mg at 08/10/18 0836    lidocaine (LIDODERM) 5 % patch 1 patch, 1 patch, Transdermal, Daily, Vlad Diop MD, Stopped at 08/10/18 0836    LORazepam (ATIVAN) 2 mg/mL injection 0 5 mg, 0 5 mg, Intravenous, Q4H PRN, Vlad Diop MD    melatonin tablet 6 mg, 6 mg, Oral, HS PRN, Marvelyn Beauvais Spatzer, CRNP, 6 mg at 08/09/18 2237    ondansetron (ZOFRAN) injection 4 mg, 4 mg, Intravenous, Q6H PRN, Vlad Diop MD, 4 mg at 08/09/18 2029    potassium chloride (K-DUR,KLOR-CON) CR tablet 20 mEq, 20 mEq, Oral, Daily, Vlad Diop MD, 20 mEq at 08/10/18 9256    thiamine (VITAMIN B1) tablet 100 mg, 100 mg, Oral, Daily, Lisa Marroquin MD, 100 mg at 08/10/18 0836    vitamin E (tocopherol) capsule 400 Units, 400 Units, Oral, Daily, Lisa Marroquin MD, 400 Units at 08/10/18 0836    zinc gluconate tablet 100 mg, 100 mg, Oral, Daily, Lisa Marroquin MD, 100 mg at 08/10/18 8307    This note was completed in part utilizing [employer] Software  Grammatical errors, random word insertions, spelling mistakes, and incomplete sentences may be an occasional consequence of this system secondary to software limitations, ambient noise, and hardware issues  If you have any questions or concerns about the content, text, or information contained within the body of this dictation, please contact the provider for clarification

## 2018-08-10 NOTE — CONSULTS
08/10/18 2072   Recommendations/Interventions   Summary Reviewed low sodium diet information  Also reviewed ways to increase calories using lower sodium foods during the day such as adding whole milk to smoothies and eating nuts w/ no sodium  Also eating smaller, more frequent meals vs  picking throughout the day  He is eating very well here and is meeting increased estimated needs to assist w/ weight gain  Will continue to monitor during hospital stay  Malnutrition/BMI Present Yes   Degree of Malnutrition Malnutrition of moderate degree  (related to erratic po intake during the day at home as evidenced by mild mucle wasting around temples and clavicles, fat wasting around orbitals  Treated with pt eating 3 meals per day during hospital stay which meets his increased needs at this time)   Malnutrition Characteristics Muscle loss; Fat loss   BMI Classifications Underweight < 18 5   Nutrition Recommendations Continue diet as ordered

## 2018-08-10 NOTE — SOCIAL WORK
Heart Failure Care Coordination Note  RE: Met with patient and wife, Karma Cramer to establish a relationship and explain role  The patient was the [facility] Talk: Living with Heart Failure booklet  We discussed importance of daily weights and what to do with a rapid weight increase  We also discussed at length dietary and lifestyle changes  I asked that a dietician follow up with them as they had many questions regarding food choices  I will continue to follow as an inpatient and follow on discharge telephonically

## 2018-08-10 NOTE — PHYSICAL THERAPY NOTE
PT EVALUATION    Pt  Name: Kathy Bryan  Pt  Age: 70 y o    MRN: [id number]    Patient Active Problem List   Diagnosis    [facility] ([facility] [address] [facility]    Chronic obstructive pulmonary disease (HCC)    Pain of right lower leg    Rib pain on right side    Nodule of left lung    Chronic cough    Anxiety    Acute congestive heart failure (HCC)    Atrial fibrillation with rapid ventricular response (HCC)    Acute diastolic heart failure (HCC)    Restless leg syndrome, uncontrolled       Admitting Diagnoses:   CHF [facility]congestive heart failure) ([facility]) [I50 9]  SOB (shortness of breath) [R06 02]  Atrial fibrillation, rapid ([facility] [address] [facility] [I48 91]    Past Medical History:   Diagnosis Date    Allergic rhinitis     last assessed [id number]    Atrial fibrillation ([facility] [address] [facility]     last assessed [id number]    Atypical chest pain     last assessed [id number]    Closed fracture of pubis ([facility] [address] [facility]     Community acquired pneumonia     last assessed [id number]    COPD (chronic obstructive pulmonary disease) ([facility] [address] [facility]     Nodule of right lung     last assessed [id number]    Pleural effusion     last assessed [id number]    Polyuria     last assessed [id number]    Traumatic pneumothorax        Past Surgical History:   Procedure Laterality Date    HERNIA REPAIR      TOOTH EXTRACTION            08/10/18 1715   Note Type   Note type Eval only   Pain Assessment   Pain Score No Pain   Home Living   Type of [address] Two level;Stairs to enter with rails  (3STE; 14 steps to 2nd level)   Prior Function   Level of [place] Independent with ADLs and functional mobility  (w/o AD)   Lives With Spouse   Receives Help From Family   ADL Assistance Independent   Falls in the last 6 months 0   Comments (+) driving   Restrictions/Precautions   Weight Bearing Precautions Per Order No   Other Precautions Telemetry   General   Family/Caregiver Present Yes   Cognition   Overall Cognitive Status [facility]   Arousal/Participation Alert   Orientation Level Oriented X4   Following Commands Follows all commands and directions without difficulty   RUE Assessment   RUE Assessment WNL   LUE Assessment   LUE Assessment WNL   RLE Assessment   RLE Assessment WNL   LLE Assessment   LLE Assessment WNL   Coordination   Movements are Fluid and Coordinated 1   Sensation WFL   Bed Mobility   Supine to Sit 7  Independent   Transfers   Sit to Stand 7  Independent   Stand to Sit 7  Independent   Ambulation/Elevation   Gait pattern WNL; Forward Flexion   Gait Assistance 7  Independent   Assistive Device None   Distance 200'x1   Stair Management Assistance (offered but pt declined; no difficulties anticipated)   Balance   Static Sitting Normal   Static Standing Normal   Ambulatory Good   Endurance Deficit   Endurance Deficit No   Activity Tolerance   Activity Tolerance Patient tolerated treatment well   Nurse Made Aware yes   Assessment   Prognosis Excellent   Assessment Pt  78 y o male presented w/ inc SOB, BLE swelling & 10lb wt gain  CXR showed Small right basilar atelectasis/infiltrate & Trace bilateral pleural effusions  Pt admitted for afib w/ RVR w/ CHF (congestive heart failure) ([facility] [address] [facility]  & ARF  Pt referred to PT for mobility assessment & D/C planning w/ orders of up w/ assistance  PTA, pt reports being I w/o AD  On eval, pt demonstrate no decline in function to warrant skilled PT at this time  Pt I w/ overall functional mobility including amb w/o AD  Offered stair training but pt declined as he does not anticipate any difficulty w/ stair mgt  Given level of I, PT does not anticipate any difficulties w/ stair mgt as well  Pt states being at  to his  functional baseline and states no concerns about going back home when medically cleared  No dizziness reported t/o session   Nsg staff most recent vital signs as follows: BP (!) 88/66 (BP Location: Right arm)   Pulse 66   Temp 97 9 °F (36 6 °C) (Tympanic)   Resp 18   Ht 6' (1 829 m)   Wt 58 9 kg (129 lb 13 6 oz)   SpO2 95%   BMI 17 61 kg/m²   Will D/C PT  Will anticipate safe return to home when medically cleared  Pt to continue to ambulate in unit as tolerated to prevent decline in function  Please advise PT when needs change  Nsg notified      Barriers to Discharge None   Goals   Patient Goals go home   Treatment Day 0   Plan   Treatment/Interventions Spoke to nursing;Family  (PT eval only)   PT Frequency Other (Comment)  (D/C PT)   Recommendation   Recommendation Home with family support;D/C PT   Equipment Recommended (none)   PT - OK to Discharge Yes  (when medically cleared)   Barthel Index   Feeding 10   Bathing 5   Grooming Score 5   Dressing Score 10   Bladder Score 10   Bowels Score 10   Toilet Use Score 10   Transfers (Bed/Chair) Score 15   Mobility (Level Surface) Score 15   Stairs Score 10   Barthel Index Score 100   Hx/personal factors: co-morbidities, inaccessible home, dec caregiver support, advanced age and telemetry  Examination: assessed body system, balance, endurance, amb, D/C disposition & fall risk  Clinical: unpredictable (ongoing medical status, abnormal lab values and low fall risk)  Complexity: high     Sakina Yee, PT

## 2018-08-10 NOTE — PROGRESS NOTES
Pt requested a sleep aid  Pt denies taking any prior medications to assist with sleep at home  Called [facility]; new order for Melatonin 6 mg PRN at bedtime placed and initiated

## 2018-08-10 NOTE — CASE MANAGEMENT
Initial Clinical Review    Admission: Date/Time/Statement: 8/9/18 @ 1208     Orders Placed This Encounter   Procedures    Inpatient Admission (expected length of stay for this patient is greater than two midnights)     Standing Status:   Standing     Number of Occurrences:   1     Order Specific Question:   Admitting Physician     Answer:   Ebenezer Walls [[id number]]     Order Specific Question:   Level of Care     Answer:   Med Surg [[id number]]     Order Specific Question:   Estimated length of stay     Answer:   More than 2 Midnights     Order Specific Question:   Certification     Answer:   I certify that inpatient services are medically necessary for this patient for a duration of greater than two midnights  See H&P and MD Progress Notes for additional information about the patient's course of treatment  ED: Date/Time/Mode of Arrival:   ED Arrival Information     Expected Arrival Acuity Means of Arrival Escorted By Service Admission Type    - 8/9/2018 10:37 Urgent Walk-In Family Member General Medicine Urgent    Arrival Complaint    SOB,AFIB        Chief Complaint:   Chief Complaint   Patient presents with    Shortness of Breath     Started approximately 3 days ago  History of Illness: 72-year-old male with history of atrial fibrillation and new onset heart failure presents for dyspnea  Has been waxing waning over the past few months  Worse over the past three days  Reports feeling lightheaded  Has been following with Cardiology but does not take any rate control due to problems with synthetic drugs  Denies chest pain nausea vomiting or diarrhea  Called his cardiologist today and he told him to come in to get evaluated  On arrival the patient was tachycardic to 140 within normal blood pressure  He is mentating normally  No other modifying factors or associated symptoms   Moderate severity    Irregular irregular tachycardia, Slight JVD     Assessment plan:  AFib with rapid ventricular response with likely fluid overload from congestive heart failure  Will gently diurese and attempt rate control given that the patient is not on anticoagulation and is not candidate for cardioversion  ED Vital Signs:   ED Triage Vitals   Temperature Pulse Respirations Blood Pressure SpO2   08/09/18 1046 08/09/18 1046 08/09/18 1046 08/09/18 1046 08/09/18 1046   97 8 °F (36 6 °C) 61 16 146/78 95 %      Temp Source Heart Rate Source Patient Position - Orthostatic VS BP Location FiO2 (%)   08/09/18 1046 08/09/18 1135 08/09/18 1046 08/09/18 1046 --   Oral Monitor Sitting Right arm       Pain Score       08/09/18 1046       9        Wt Readings from Last 1 Encounters:   08/10/18 58 9 kg (129 lb 13 6 oz)       Vital Signs (abnormal):   08/09/18 1300 --  108 20 117/72 95 % -- ES   08/09/18 1245 --  106 -- -- 94 % -- EP   08/09/18 1233 --  117 18 144/97 95 % -- SW   08/09/18 1135 --  109 20 123/85 92 % -- ES   08/09/18 1100 --  138 19 -- 94 %           Abnormal Labs/Diagnostic Test Results:  Plats 125,   Cr 1 32,   Glu 151    CXR:  Small right basilar atelectasis/infiltrate   Trace bilateral pleural effusions      EKG:  A Fib with RVR ( vent rate 138)    ED Treatment:   Medication Administration from 08/09/2018 1037 to 08/09/2018 1332       Date/Time Order Dose Route Action Action by Comments     08/09/2018 1118 diltiazem (CARDIZEM) injection 10 mg 10 mg Intravenous Given       08/09/2018 1154 furosemide (LASIX) injection 40 mg 40 mg Intravenous Given       08/09/2018 1326 diltiazem (CARDIZEM) 125 mg in sodium chloride 0 9 % 125 mL infusion 12 5 mg/hr Intravenous Rate/Dose Change       08/09/2018 1312 diltiazem (CARDIZEM) 125 mg in sodium chloride 0 9 % 125 mL infusion 10 mg/hr Intravenous Rate/Dose Change       08/09/2018 1255 diltiazem (CARDIZEM) 125 mg in sodium chloride 0 9 % 125 mL infusion 7 5 mg/hr Intravenous Rate/Dose Change       08/09/2018 1232 diltiazem (CARDIZEM) 125 mg in sodium chloride 0 9 % 125 mL infusion 5 mg/hr Intravenous New Bag       08/09/2018 1326 diltiazem (CARDIZEM) 125 mg in sodium chloride 0 9 % 125 mL infusion 12 5 mg/hr Intravenous Continue to Inpatient Floor       08/09/2018 1314 lidocaine (LIDODERM) 5 % patch 1 patch 1 patch Transdermal Medication Applied            Past Medical/Surgical History: Active Ambulatory Problems     Diagnosis Date Noted    [facility]rial fibrillation ([facility] ) 04/18/2013    Chronic obstructive pulmonary disease ([facility] ) 04/18/2013    Pain of right lower leg 10/10/2017    Rib pain on right side 06/01/2016    Nodule of left lung 04/25/2018    Chronic cough 07/11/2018    Anxiety 07/11/2018    Acute congestive heart failure ([facility] ) 07/26/2018     Resolved Ambulatory Problems     Diagnosis Date Noted    No Resolved Ambulatory Problems     Past Medical History:   Diagnosis Date    Allergic rhinitis     Atrial fibrillation ([facility])     Atypical chest pain     Closed fracture of pubis ([facility] )     Community acquired pneumonia     COPD (chronic obstructive pulmonary disease) ([facility])     Nodule of right lung     Pleural effusion     Polyuria     Traumatic pneumothorax        Admitting Diagnosis: CHF (congestive heart failure) ([facility]) [I50 9]  SOB (shortness of breath) [R06 02]  Atrial fibrillation, rapid ([facility] ) [I48 91]    Age/Sex: 70 y o  male    Assessment/Plan:   1-atrial fibrillation with rapid ventricular rate-the patient presented with palpitations and heart rate of around 140  With a diltiazem drip his heart rate is down between 100 and 110  Patient has not responded well to diltiazem and digoxin in the past however he has happened able to trying diltiazem again this time  Echocardiogram has been ordered  Patient has refused anticoagulation in the past but ongoing discussions are being held with the patient to  try Eliquis      2-acute diastolic heart failure-patient states his symptoms have improved with IV diuresis  This will be continued at 40 mg daily    Will continue to monitor input output and daily weights  As mentioned previously her cardiogram has been ordered      3-COPD without exacerbation-continue home regimen      4-anxiety-the patient states that he gets more anxious when admitted to hospital and requests that he be on an anti anxiolytic      5-acute renal failure-likely secondary to diuretic use  Baseline creatinine is around 1 07   Current creatinine around 1 32  Will continue to monitor this closely      DVT prophylaxis Lovenox      The patient will be admitted with an inpatient status and I expect a more than 2 midnight stay for this patient  Admission Orders:  IP  TELE  EKG with CP or st changes  Consult Cardio  PT Eval  NTproBNP,   BMP,  Ferritin  EKG  ECHO      Scheduled Meds:   Current Facility-Administered Medications:         aspirin 81 mg Oral Daily           diltiazem 180 mg Oral Daily           fondaparinux 2 5 mg Subcutaneous Daily    furosemide 40 mg Intravenous Daily    lidocaine 1 patch Transdermal Daily                         potassium chloride 20 mEq Oral Daily    vitamin B-1 100 mg Oral Daily    vitamin E (tocopherol) 400 Units Oral Daily    zinc gluconate 100 mg Oral Daily      Continuous Infusions:  Cardizem  Drip  PRN Meds:   acetaminophen    benzonatate    LORazepam    Melatonin x 1    Ondansetron IV x 1  8/10:   98 4 - 105 - 18   118/69  Na 134,   BUN 27,   Cr 1 45      Thank you,  Louis Aqq  [facility] Utilization Review Department  Phone: [phone]; Fax [phone]  ATTENTION: The Network Utilization Review Department is now centralized for our 9 Facilities  Make a note that we have a new phone and fax numbers for our Department  Please call with any questions or concerns to [phone] and carefully follow the prompts so that you are directed to the right person  All voicemails are confidential  Fax any determinations, approvals, denials, and requests for initial or continue stay review clinical to [phone]   Due to HIGH CALL volume, it would be easier if you could please send faxed requests to expedite your requests and in part, help us provide discharge notifications faster

## 2018-08-11 LAB
ANION GAP SERPL CALCULATED.3IONS-SCNC: 7 MMOL/L (ref 4–13)
BUN SERPL-MCNC: 30 MG/DL (ref 5–25)
CALCIUM SERPL-MCNC: 8.4 MG/DL (ref 8.3–10.1)
CHLORIDE SERPL-SCNC: 98 MMOL/L (ref 100–108)
CO2 SERPL-SCNC: 27 MMOL/L (ref 21–32)
CREAT SERPL-MCNC: 1.27 MG/DL (ref 0.6–1.3)
GFR SERPL CREATININE-BSD FRML MDRD: 55 ML/MIN/1.73SQ M
GLUCOSE SERPL-MCNC: 91 MG/DL (ref 65–140)
POTASSIUM SERPL-SCNC: 4.7 MMOL/L (ref 3.5–5.3)
SODIUM SERPL-SCNC: 132 MMOL/L (ref 136–145)

## 2018-08-11 PROCEDURE — 99232 SBSQ HOSP IP/OBS MODERATE 35: CPT | Performed by: HOSPITALIST

## 2018-08-11 PROCEDURE — 99232 SBSQ HOSP IP/OBS MODERATE 35: CPT | Performed by: INTERNAL MEDICINE

## 2018-08-11 PROCEDURE — 80048 BASIC METABOLIC PNL TOTAL CA: CPT | Performed by: INTERNAL MEDICINE

## 2018-08-11 RX ORDER — LISINOPRIL 5 MG/1
5 TABLET ORAL DAILY
Status: DISCONTINUED | OUTPATIENT
Start: 2018-08-11 | End: 2018-08-13

## 2018-08-11 RX ADMIN — POTASSIUM CHLORIDE 20 MEQ: 1500 TABLET, EXTENDED RELEASE ORAL at 08:15

## 2018-08-11 RX ADMIN — Medication 100 MG: at 08:15

## 2018-08-11 RX ADMIN — APIXABAN 2.5 MG: 2.5 TABLET, FILM COATED ORAL at 17:18

## 2018-08-11 RX ADMIN — METOPROLOL TARTRATE 25 MG: 25 TABLET ORAL at 08:15

## 2018-08-11 RX ADMIN — LISINOPRIL 5 MG: 5 TABLET ORAL at 17:17

## 2018-08-11 RX ADMIN — APIXABAN 2.5 MG: 2.5 TABLET, FILM COATED ORAL at 08:15

## 2018-08-11 RX ADMIN — METOPROLOL TARTRATE 25 MG: 25 TABLET ORAL at 20:08

## 2018-08-11 RX ADMIN — LIDOCAINE 1 PATCH: 50 PATCH CUTANEOUS at 08:15

## 2018-08-11 RX ADMIN — VITAMIN E CAP 400 UNIT 400 UNITS: 400 CAP at 08:15

## 2018-08-11 NOTE — PROGRESS NOTES
Progress Note - Darien Dumont 70 y o  male MRN: [id number]    Unit/Bed#: E4 -01 Encounter: [id number]    Principal Problem:    Acute diastolic heart failure (HCC)    Restless leg syndrome, uncontrolled  Active Problems:    Chronic obstructive pulmonary disease (HCC)    Rib pain on right side    Anxiety    Atrial fibrillation with rapid ventricular response ([facility])        Assessment/Plan:  1-atrial fibrillation with rapid ventricular rate-the patient presented with palpitations and heart rate of around 140  This is now rate controlled       Echocardiogram reveals severe tricuspid regurg with right ventricular dilatation moderate  Patient on oral diltiazem-plan to change this to metoprolol today  Patient agreeable to start Eliquis      2-acute diastolic heart failure-patient states his symptoms have improved with IV diuresis  This was held yesterday secondary to low blood pressure Plan to resume oral diuretics today   Renate Daniel continue to monitor input output and daily weights       3-COPD without exacerbation-continue home regimen      4-anxiety-the patient states that he gets more anxious when admitted to hospital-but has remained stable so far      5-acute renal failure-likely secondary to diuretic use   Baseline creatinine is around 1 07   Current creatinine around 1 27      DVT prophylaxis Lovenox      6-restless leg-no issues since admission  Anticipate discharge to home tomorrow if cleared by Cardiology      Subjective:  Patient states breathing has improved  Plan to start metoprolol from today  Echo reveals severe tricuspid regurg with moderate right ventricular dilatation  Heart rate well controlled    Physical Exam:   Vitals: Blood pressure 113/82, pulse (!) 111, temperature (!) 97 2 °F (36 2 °C), temperature source Tympanic, resp  rate 18, height 6' (1 829 m), weight 59 2 kg (130 lb 8 2 oz), SpO2 92 %  ,Body mass index is 17 7 kg/m²  Gen:  Pleasant, non-tachypnic, non-dyspnic  Conversant  Heart: regular rate and rhythm, S1S2 present, no murmur, rub or gallop  Lungs: clear to ausculatation bilaterally  No wheezing, crackless, or rhonchi  No accessory muscle use or respiratory distress  Abd: soft, non-tender, non-distended  NABS, no guarding, rebound or peritoneal signs  Extremities: no clubbing, cyanosis or edema  2+pedal pulses bilaterally  Full range of motion  Neuro: awake, alert and oriented  Cranial nerves 2-12 intact  Strength and sensation grossly intact  Skin: warm and dry: no petechiae, purpura and rash      LABS:     Results from last 7 days  Lab Units 08/09/18  1104   WBC Thousand/uL 8 68   HEMOGLOBIN g/dL 15 0   HEMATOCRIT % 44 9   PLATELETS Thousands/uL 125*       Results from last 7 days  Lab Units 08/11/18  0506 08/10/18  0426 08/09/18  1104   SODIUM mmol/L 132* 134* 139   POTASSIUM mmol/L 4 7 4 5 3 7   CHLORIDE mmol/L 98* 101 104   CO2 mmol/L 27 27 30   BUN mg/dL 30* 27* 24   CREATININE mg/dL 1 27 1 45* 1 32*   GLUCOSE RANDOM mg/dL 91 113 151*   CALCIUM mg/dL 8 4 8 5 8 7       Intake/Output Summary (Last 24 hours) at 08/11/18 0933  Last data filed at 08/10/18 1807   Gross per 24 hour   Intake                0 ml   Output              375 ml   Net             -375 ml           Current Facility-Administered Medications:  acetaminophen 650 mg Oral Q6H PRN Westley Han MD   apixaban 2 5 mg Oral BID Rukyree Hull, DO   benzonatate 100 mg Oral TID PRN Westley Han MD   lidocaine 1 patch Transdermal Daily Paige Mendenhall MD   LORazepam 0 5 mg Intravenous Q4H PRN Westley Han MD   melatonin 6 mg Oral HS PRN Rosaen Lennox Spatzer, CRNP   metoprolol tartrate 25 mg Oral Q12H [address] Rujul Hull, DO   ondansetron 4 mg Intravenous Q6H PRN Westley Han MD   potassium chloride 20 mEq Oral Daily Westley Han MD   vitamin B-1 100 mg Oral Daily Westley Han MD   vitamin E (tocopherol) 400 Units Oral Daily Paige Mendenhall MD   zinc gluconate 100 mg Oral Daily Ana Mcpherson MD

## 2018-08-11 NOTE — PROGRESS NOTES
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  3  Acute systolic/diastolic congestive heart failure  4  Severe tricuspid regurgitation with moderate right ventricular dilatation    · Heart rate significantly improved, continue metoprolol  · Add lisinopril 5 mg p  o  daily in light of cardiomyopathy  · Will add p o  furosemide tomorrow if blood pressure able to tolerate ACE-inhibitor and beta-blocker with continued rate control  · Continue anticoagulation with Eliquis        Subjective:  Patient seen and examined, feels well    Some exertional dyspnea still present but overall much better      Physical Exam:  GEN:  NAD  HEENT:  MMM, NCAT, pink conjunctiva, EOMI, nonicteric sclera  CV:  + pulsatile JVD/HJR, irregularly irregular, rate controlled, NO M/R/G, +S1/S2, NO PARASTERNAL HEAVE/THRILL, NO LE EDEMA, NO HEPATIC SYSTOLIC PULSATION, WARM EXTREMITIES  RESP:  CTAB/L with distant breath sounds  ABD:  SOFT, NT, NO GROSS ORGANOMEGALY        Vitals:   /70 (BP Location: Right arm)   Pulse 79   Temp (!) 97 2 °F (36 2 °C) (Tympanic)   Resp 18   Ht 6' (1 829 m)   Wt 59 2 kg (130 lb 8 2 oz)   SpO2 92%   BMI 17 70 kg/m²   Vitals:    08/11/18 0507 08/11/18 0600   Weight: 59 2 kg (130 lb 8 2 oz) 59 2 kg (130 lb 8 2 oz)       Intake/Output Summary (Last 24 hours) at 08/11/18 1350  Last data filed at 08/11/18 1301   Gross per 24 hour   Intake              500 ml   Output              375 ml   Net              125 ml       TELEMETRY:  Atrial fibrillation, rate controlled  Lab Results:    Results from last 7 days  Lab Units 08/09/18  1104   WBC Thousand/uL 8 68   HEMOGLOBIN g/dL 15 0   HEMATOCRIT % 44 9   PLATELETS Thousands/uL 125*       Results from last 7 days  Lab Units 08/11/18  0506 08/09/18  1104   SODIUM mmol/L 132*  < > 139   POTASSIUM mmol/L 4 7  < > 3 7   CHLORIDE mmol/L 98*  < > 104   CO2 mmol/L 27  < > 30   BUN mg/dL 30*  < > 24   CREATININE mg/dL 1 27  < > 1 32*   CALCIUM mg/dL 8 4  < > 8 7   TOTAL PROTEIN g/dL  --   --  7 2   BILIRUBIN TOTAL mg/dL  --   --  0 97   ALK PHOS U/L  --   --  70   ALT U/L  --   --  46   AST U/L  --   --  33   GLUCOSE RANDOM mg/dL 91  < > 151*   < > = values in this interval not displayed      Results from last 7 days  Lab Units 08/11/18  0506   SODIUM mmol/L 132*   POTASSIUM mmol/L 4 7   CHLORIDE mmol/L 98*   CO2 mmol/L 27   BUN mg/dL 30*   CREATININE mg/dL 1 27   GLUCOSE RANDOM mg/dL 91   CALCIUM mg/dL 8 4       Results from last 7 days  Lab Units 08/10/18  0426   CHOLESTEROL mg/dL 99       Medications:    Current Facility-Administered Medications:     acetaminophen (TYLENOL) tablet 650 mg, 650 mg, Oral, Q6H PRN, Ofelia Brandt MD    apixaban (ELIQUIS) tablet 2 5 mg, 2 5 mg, Oral, BID, Rujul Hull, DO, 2 5 mg at 08/11/18 0815    benzonatate (TESSALON PERLES) capsule 100 mg, 100 mg, Oral, TID PRN, Ofelia Brandt MD    lidocaine (LIDODERM) 5 % patch 1 patch, 1 patch, Transdermal, Daily, Ofelia Brandt MD, Stopped at 08/11/18 0815    LORazepam (ATIVAN) 2 mg/mL injection 0 5 mg, 0 5 mg, Intravenous, Q4H PRN, Ofelia Brandt MD    melatonin tablet 6 mg, 6 mg, Oral, HS PRN, Marrion Spanish Spatzer, CRNP, 6 mg at 08/10/18 2133    metoprolol tartrate (LOPRESSOR) tablet 25 mg, 25 mg, Oral, Q12H [address], Rujul Hull, DO, 25 mg at 08/11/18 0815    ondansetron (ZOFRAN) injection 4 mg, 4 mg, Intravenous, Q6H PRN, Ofelia Brandt MD, 4 mg at 08/09/18 2029    potassium chloride (K-DUR,KLOR-CON) CR tablet 20 mEq, 20 mEq, Oral, Daily, Paige Mendenhall MD, 20 mEq at 08/11/18 0815    thiamine (VITAMIN B1) tablet 100 mg, 100 mg, Oral, Daily, Paige Mendnehall MD, 100 mg at 08/11/18 0815    vitamin E (tocopherol) capsule 400 Units, 400 Units, Oral, Daily, Paige MD Za, 400 Units at 08/11/18 0815    zinc gluconate tablet 100 mg, 100 mg, Oral, Daily, Dianne Washington MD, 100 mg at 08/11/18 0815    This note was completed in part utilizing [employer] Fluency Direct Software  Grammatical errors, random word insertions, spelling mistakes, and incomplete sentences may be an occasional consequence of this system secondary to software limitations, ambient noise, and hardware issues  If you have any questions or concerns about the content, text, or information contained within the body of this dictation, please contact the provider for clarification

## 2018-08-12 LAB
ANION GAP SERPL CALCULATED.3IONS-SCNC: 2 MMOL/L (ref 4–13)
BUN SERPL-MCNC: 24 MG/DL (ref 5–25)
CALCIUM SERPL-MCNC: 8.7 MG/DL (ref 8.3–10.1)
CHLORIDE SERPL-SCNC: 105 MMOL/L (ref 100–108)
CO2 SERPL-SCNC: 31 MMOL/L (ref 21–32)
CREAT SERPL-MCNC: 1.11 MG/DL (ref 0.6–1.3)
GFR SERPL CREATININE-BSD FRML MDRD: 64 ML/MIN/1.73SQ M
GLUCOSE SERPL-MCNC: 77 MG/DL (ref 65–140)
POTASSIUM SERPL-SCNC: 4.7 MMOL/L (ref 3.5–5.3)
SODIUM SERPL-SCNC: 138 MMOL/L (ref 136–145)

## 2018-08-12 PROCEDURE — 99232 SBSQ HOSP IP/OBS MODERATE 35: CPT | Performed by: INTERNAL MEDICINE

## 2018-08-12 PROCEDURE — 80048 BASIC METABOLIC PNL TOTAL CA: CPT | Performed by: HOSPITALIST

## 2018-08-12 PROCEDURE — 99232 SBSQ HOSP IP/OBS MODERATE 35: CPT | Performed by: HOSPITALIST

## 2018-08-12 RX ORDER — FUROSEMIDE 20 MG/1
20 TABLET ORAL DAILY
Status: DISCONTINUED | OUTPATIENT
Start: 2018-08-13 | End: 2018-08-14 | Stop reason: HOSPADM

## 2018-08-12 RX ORDER — METOPROLOL TARTRATE 50 MG/1
50 TABLET, FILM COATED ORAL EVERY 12 HOURS SCHEDULED
Status: DISCONTINUED | OUTPATIENT
Start: 2018-08-12 | End: 2018-08-13

## 2018-08-12 RX ADMIN — VITAMIN E CAP 400 UNIT 400 UNITS: 400 CAP at 08:53

## 2018-08-12 RX ADMIN — APIXABAN 2.5 MG: 2.5 TABLET, FILM COATED ORAL at 08:53

## 2018-08-12 RX ADMIN — APIXABAN 5 MG: 5 TABLET, FILM COATED ORAL at 17:35

## 2018-08-12 RX ADMIN — METOPROLOL TARTRATE 25 MG: 25 TABLET ORAL at 08:53

## 2018-08-12 RX ADMIN — Medication 100 MG: at 08:53

## 2018-08-12 RX ADMIN — LIDOCAINE 1 PATCH: 50 PATCH CUTANEOUS at 17:41

## 2018-08-12 RX ADMIN — METOPROLOL TARTRATE 50 MG: 50 TABLET ORAL at 20:36

## 2018-08-12 RX ADMIN — POTASSIUM CHLORIDE 20 MEQ: 1500 TABLET, EXTENDED RELEASE ORAL at 08:53

## 2018-08-12 RX ADMIN — LISINOPRIL 5 MG: 5 TABLET ORAL at 08:53

## 2018-08-12 NOTE — PROGRESS NOTES
Progress Note - Cardiology   Harmony Mackenzie 70 y o  male MRN: [id number]  Unit/Bed#: E4 -01 Encounter: [id number]      Assessment/Recommendations/Discussion:   1  Paroxysmal atrial fibrillation with rapid ventricular response  2  Cardiomyopathy, suspect tachycardia mediated  3  Acute systolic/diastolic congestive heart failure  4  Severe tricuspid regurgitation with moderate right ventricular dilatation    · Increase metoprolol to 50 po BID for optimal rate control  · Start furosemide 20 po daily tomorrow  · Increase Eliquis to 5 po BID (renal fx normalized)  · Continue lisinopril  Eventually will add aldactone if able        Subjective: Pt seen/examined    Feels well, no CP/SOB, palps, LH      Physical Exam:  GEN:  NAD  HEENT:  MMM, NCAT, pink conjunctiva, EOMI, nonicteric sclera  CV:  NO JVD/HJR, Irregular rhythm, NO M/R/G, +S1/S2, NO PARASTERNAL HEAVE/THRILL, NO LE EDEMA, NO HEPATIC SYSTOLIC PULSATION, WARM EXTREMITIES  RESP:  CTAB/L  ABD:  SOFT, NT, NO GROSS ORGANOMEGALY        Vitals:   /96 (BP Location: Left arm)   Pulse 91   Temp (!) 96 8 °F (36 °C) (Tympanic)   Resp 18   Ht 6' (1 829 m)   Wt 58 7 kg (129 lb 6 6 oz)   SpO2 94%   BMI 17 55 kg/m²   Vitals:    08/12/18 0500 08/12/18 0600   Weight: 58 7 kg (129 lb 6 6 oz) 58 7 kg (129 lb 6 6 oz)       Intake/Output Summary (Last 24 hours) at 08/12/18 1426  Last data filed at 08/12/18 0900   Gross per 24 hour   Intake              820 ml   Output                0 ml   Net              820 ml         Lab Results:    Results from last 7 days  Lab Units 08/09/18  1104   WBC Thousand/uL 8 68   HEMOGLOBIN g/dL 15 0   HEMATOCRIT % 44 9   PLATELETS Thousands/uL 125*       Results from last 7 days  Lab Units 08/12/18  0508  08/09/18  1104   SODIUM mmol/L 138  < > 139   POTASSIUM mmol/L 4 7  < > 3 7   CHLORIDE mmol/L 105  < > 104   CO2 mmol/L 31  < > 30   BUN mg/dL 24  < > 24   CREATININE mg/dL 1 11  < > 1 32*   CALCIUM mg/dL 8 7  < > 8 7   TOTAL PROTEIN g/dL  --   --  7 2   BILIRUBIN TOTAL mg/dL  --   --  0 97   ALK PHOS U/L  --   --  70   ALT U/L  --   --  46   AST U/L  --   --  33   GLUCOSE RANDOM mg/dL 77  < > 151*   < > = values in this interval not displayed      Results from last 7 days  Lab Units 08/12/18  0508   SODIUM mmol/L 138   POTASSIUM mmol/L 4 7   CHLORIDE mmol/L 105   CO2 mmol/L 31   BUN mg/dL 24   CREATININE mg/dL 1 11   GLUCOSE RANDOM mg/dL 77   CALCIUM mg/dL 8 7       Results from last 7 days  Lab Units 08/10/18  0426   CHOLESTEROL mg/dL 99       Medications:    Current Facility-Administered Medications:     acetaminophen (TYLENOL) tablet 650 mg, 650 mg, Oral, Q6H PRN, Lisa Marroquin MD    apixaban (ELIQUIS) tablet 5 mg, 5 mg, Oral, BID, Rumarycruzl Hull, DO    benzonatate (TESSALON PERLES) capsule 100 mg, 100 mg, Oral, TID PRN, Lisa Marroquin MD    [START ON 8/13/2018] furosemide (LASIX) tablet 20 mg, 20 mg, Oral, Daily, Cherryl Hull, DO    lidocaine (LIDODERM) 5 % patch 1 patch, 1 patch, Transdermal, Daily, Lisa Marroquin MD, Stopped at 08/12/18 0853    lisinopril (ZESTRIL) tablet 5 mg, 5 mg, Oral, Daily, Rujul Hull, DO, 5 mg at 08/12/18 0853    LORazepam (ATIVAN) 2 mg/mL injection 0 5 mg, 0 5 mg, Intravenous, Q4H PRN, Lisa Marroquin MD    melatonin tablet 6 mg, 6 mg, Oral, HS PRN, Loyal Salles Spatzer, CRNP, 6 mg at 08/10/18 2133    metoprolol tartrate (LOPRESSOR) tablet 50 mg, 50 mg, Oral, Q12H [address], Rujul Hull, DO    ondansetron (ZOFRAN) injection 4 mg, 4 mg, Intravenous, Q6H PRN, Lisa Marroquin MD, 4 mg at 08/09/18 2029    potassium chloride (K-DUR,KLOR-CON) CR tablet 20 mEq, 20 mEq, Oral, Daily, Paige Chalunkal, MD, 20 mEq at 08/12/18 0853    thiamine (VITAMIN B1) tablet 100 mg, 100 mg, Oral, Daily, Lisa Marroquin MD, 100 mg at 08/12/18 0853    vitamin E (tocopherol) capsule 400 Units, 400 Units, Oral, Daily, Lisa Marroquin MD, 400 Units at 08/12/18 0853    zinc gluconate tablet 100 mg, 100 mg, Oral, Daily, Paige MD Za, 100 mg at 08/12/18 7283    This note was completed in part utilizing [employer] Flu[employer] Direct Software  Grammatical errors, random word insertions, spelling mistakes, and incomplete sentences may be an occasional consequence of this system secondary to software limitations, ambient noise, and hardware issues  If you have any questions or concerns about the content, text, or information contained within the body of this dictation, please contact the provider for clarification

## 2018-08-12 NOTE — PROGRESS NOTES
Progress Note - Barbie Neri 70 y o  male MRN: [id number]    Unit/Bed#: E4 -01 Encounter: [id number]    Principal Problem:  Acute diastolic heart failure (HCC)  Active Problems:    Chronic obstructive pulmonary disease (HCC)    Rib pain on right side    Anxiety    Atrial fibrillation with rapid ventricular response (HCC)        Restless leg syndrome, uncontrolled      Assessment/Plan:  1-atrial fibrillation with rapid ventricular rate-the patient presented with palpitations and heart rate of around 140   This is now rate controlled       Echocardiogram reveals severe tricuspid regurg with right ventricular dilatation moderate   Patient on metoprolol -lisinopril added 8/11 2018  Georgette Ormond Patient agreeable to  Eliquis which has been started during this admission        2-acute diastolic heart failure- symptoms had improved with IV diuresis  This was held  secondary to low blood pressure  Plan to resume oral diuretics today   Constance Christianson continue to monitor input output and daily weights       3-COPD without exacerbation-continue home regimen      4-anxiety-the patient states that he gets more anxious when admitted to hospital-but has remained stable so far      5-acute renal failure-likely secondary to diuretic use   Baseline creatinine is around 1 07   Current creatinine around 1 27      DVT prophylaxis Lovenox      6-restless leg-no issues since admission    Anticipate DC home in 24-48 hours once cleared by Cardiology  Subjective:  Patient states breathing has improved  Lisinopril added in light of cardiomyopathy  Plan to add furosemide today  Continue anticoagulation with Eliquis  Physical Exam:   Vitals: Blood pressure 136/96, pulse 91, temperature (!) 96 8 °F (36 °C), temperature source Tympanic, resp  rate 18, height 6' (1 829 m), weight 58 7 kg (129 lb 6 6 oz), SpO2 94 %  ,Body mass index is 17 55 kg/m²  Gen:  Pleasant, non-tachypnic, non-dyspnic  Conversant    Heart: regular rate and rhythm, S1S2 present, no murmur, rub or gallop  Lungs: clear to ausculatation bilaterally  No wheezing, crackless, or rhonchi  No accessory muscle use or respiratory distress  Abd: soft, non-tender, non-distended  NABS, no guarding, rebound or peritoneal signs  Extremities: no clubbing, cyanosis or edema  2+pedal pulses bilaterally  Full range of motion  Neuro: awake, alert and oriented  Cranial nerves 2-12 intact  Strength and sensation grossly intact  Skin: warm and dry: no petechiae, purpura and rash      LABS:     Results from last 7 days  Lab Units 08/09/18  1104   WBC Thousand/uL 8 68   HEMOGLOBIN g/dL 15 0   HEMATOCRIT % 44 9   PLATELETS Thousands/uL 125*       Results from last 7 days  Lab Units 08/12/18  0508 08/11/18  0506 08/10/18  0426   SODIUM mmol/L 138 132* 134*   POTASSIUM mmol/L 4 7 4 7 4 5   CHLORIDE mmol/L 105 98* 101   CO2 mmol/L 31 27 27   BUN mg/dL 24 30* 27*   CREATININE mg/dL 1 11 1 27 1 45*   GLUCOSE RANDOM mg/dL 77 91 113   CALCIUM mg/dL 8 7 8 4 8 5       Intake/Output Summary (Last 24 hours) at 08/12/18 1018  Last data filed at 08/12/18 0501   Gross per 24 hour   Intake              740 ml   Output                0 ml   Net              740 ml           Current Facility-Administered Medications:  acetaminophen 650 mg Oral Q6H PRN Ana Mcpherson MD   apixaban 2 5 mg Oral BID Catina Hull, DO   benzonatate 100 mg Oral TID PRN Ana Mcpherson MD   lidocaine 1 patch Transdermal Daily Paige Mendenhall MD   lisinopril 5 mg Oral Daily Cherryl Hull, DO   LORazepam 0 5 mg Intravenous Q4H PRN Ana Mcpherson MD   melatonin 6 mg Oral HS PRN Renna Roses Spatzer, CRNP   metoprolol tartrate 25 mg Oral Q12H [facility] Catina Hull, DO   ondansetron 4 mg Intravenous Q6H PRN Ana Mcpherson MD   potassium chloride 20 mEq Oral Daily Ana Mcpherson MD   vitamin B-1 100 mg Oral Daily Ana Mcpherson MD   vitamin E (tocopherol) 400 Units Oral Daily Paige Mendenhall MD   zinc gluconate 100 mg Oral Daily Paige Rehan Judge MD

## 2018-08-13 ENCOUNTER — APPOINTMENT (INPATIENT)
Dept: NON INVASIVE DIAGNOSTICS | Facility: HOSPITAL | Age: 78
DRG: 308 | End: 2018-08-13
Payer: COMMERCIAL

## 2018-08-13 ENCOUNTER — APPOINTMENT (INPATIENT)
Dept: NUCLEAR MEDICINE | Facility: HOSPITAL | Age: 78
DRG: 308 | End: 2018-08-13
Payer: COMMERCIAL

## 2018-08-13 LAB
ANION GAP SERPL CALCULATED.3IONS-SCNC: 8 MMOL/L (ref 4–13)
BUN SERPL-MCNC: 23 MG/DL (ref 5–25)
CALCIUM SERPL-MCNC: 8.9 MG/DL (ref 8.3–10.1)
CHLORIDE SERPL-SCNC: 103 MMOL/L (ref 100–108)
CO2 SERPL-SCNC: 25 MMOL/L (ref 21–32)
CREAT SERPL-MCNC: 1 MG/DL (ref 0.6–1.3)
GFR SERPL CREATININE-BSD FRML MDRD: 73 ML/MIN/1.73SQ M
GLUCOSE SERPL-MCNC: 79 MG/DL (ref 65–140)
POTASSIUM SERPL-SCNC: 5.2 MMOL/L (ref 3.5–5.3)
SODIUM SERPL-SCNC: 136 MMOL/L (ref 136–145)

## 2018-08-13 PROCEDURE — 99232 SBSQ HOSP IP/OBS MODERATE 35: CPT | Performed by: PHYSICIAN ASSISTANT

## 2018-08-13 PROCEDURE — 78452 HT MUSCLE IMAGE SPECT MULT: CPT

## 2018-08-13 PROCEDURE — 99232 SBSQ HOSP IP/OBS MODERATE 35: CPT | Performed by: INTERNAL MEDICINE

## 2018-08-13 PROCEDURE — A9502 TC99M TETROFOSMIN: HCPCS

## 2018-08-13 PROCEDURE — 93017 CV STRESS TEST TRACING ONLY: CPT

## 2018-08-13 PROCEDURE — 80048 BASIC METABOLIC PNL TOTAL CA: CPT | Performed by: HOSPITALIST

## 2018-08-13 RX ORDER — POTASSIUM CHLORIDE 750 MG/1
10 TABLET, EXTENDED RELEASE ORAL DAILY
Status: DISCONTINUED | OUTPATIENT
Start: 2018-08-14 | End: 2018-08-14

## 2018-08-13 RX ORDER — LISINOPRIL 10 MG/1
10 TABLET ORAL DAILY
Status: DISCONTINUED | OUTPATIENT
Start: 2018-08-14 | End: 2018-08-14 | Stop reason: HOSPADM

## 2018-08-13 RX ADMIN — METOPROLOL TARTRATE 75 MG: 50 TABLET ORAL at 21:47

## 2018-08-13 RX ADMIN — LISINOPRIL 5 MG: 5 TABLET ORAL at 08:40

## 2018-08-13 RX ADMIN — Medication 100 MG: at 08:40

## 2018-08-13 RX ADMIN — POTASSIUM CHLORIDE 20 MEQ: 1500 TABLET, EXTENDED RELEASE ORAL at 08:41

## 2018-08-13 RX ADMIN — METOPROLOL TARTRATE 50 MG: 50 TABLET ORAL at 08:41

## 2018-08-13 RX ADMIN — VITAMIN E CAP 400 UNIT 400 UNITS: 400 CAP at 08:40

## 2018-08-13 RX ADMIN — Medication 100 MG: at 08:41

## 2018-08-13 RX ADMIN — APIXABAN 5 MG: 5 TABLET, FILM COATED ORAL at 17:25

## 2018-08-13 RX ADMIN — FUROSEMIDE 20 MG: 80 TABLET ORAL at 08:40

## 2018-08-13 RX ADMIN — REGADENOSON 0.4 MG: 0.08 INJECTION, SOLUTION INTRAVENOUS at 11:56

## 2018-08-13 RX ADMIN — BENZONATATE 100 MG: 100 CAPSULE ORAL at 04:46

## 2018-08-13 RX ADMIN — APIXABAN 5 MG: 5 TABLET, FILM COATED ORAL at 08:40

## 2018-08-13 NOTE — PROGRESS NOTES
Progress Note - Cardiology   Gordon Murray 70 y o  male MRN: [id number]  Unit/Bed#: E4 -01 Encounter: [id number]      Assessment:     1  Paroxysmal atrial fibrillation with rapid ventricular response  2  Cardiomyopathy possible tachycardia mediated  3  Acute systolic/diastolic congestive heart failure  4  Severe tricuspid regurgitation with moderate right ventricular dilatation    Discussion/Recommendations:    Continue beta-blocker -will increase to 75 mg twice daily    Will increase lisinopril to 10 mg daily  Continue apixaban  Will decrease potassium  Basic metabolic panel about 9-85 days after discharge  Will need LifeVest to protect against ventricular arhythmmias       Subjective:  No chest pain or trouble breathing      Physical Exam:  GEN:  NAD  HEENT:  MMM, NCAT, pink conjunctiva, EOMI, nonicteric sclera  CV:   Mild JVD,  irregular, NO M/R/G, +S1/S2, NO PARASTERNAL HEAVE/THRILL, NO LE EDEMA, NO HEPATIC SYSTOLIC PULSATION, WARM EXTREMITIES  RESP:  CTAB/L  ABD:  SOFT, NT, NO GROSS ORGANOMEGALY        Vitals:   /76 (BP Location: Right arm)   Pulse 75   Temp 98 2 °F (36 8 °C) (Tympanic)   Resp 18   Ht 6' (1 829 m)   Wt 59 9 kg (132 lb 0 9 oz)   SpO2 95%   BMI 17 91 kg/m²   Vitals:    08/13/18 0433 08/13/18 0600   Weight: 59 9 kg (132 lb 0 9 oz) 59 9 kg (132 lb 0 9 oz)       Intake/Output Summary (Last 24 hours) at 08/13/18 1514  Last data filed at 08/12/18 1801   Gross per 24 hour   Intake              520 ml   Output                0 ml   Net              520 ml         TELEMETRY: Atrial fibrillation -rapid at times  Lab Results:    Results from last 7 days  Lab Units 08/09/18  1104   WBC Thousand/uL 8 68   HEMOGLOBIN g/dL 15 0   HEMATOCRIT % 44 9   PLATELETS Thousands/uL 125*       Results from last 7 days  Lab Units 08/13/18  0443  08/09/18  1104   SODIUM mmol/L 136  < > 139   POTASSIUM mmol/L 5 2  < > 3 7   CHLORIDE mmol/L 103  < > 104   CO2 mmol/L 25  < > 30   BUN mg/dL 23  < > 24   CREATININE mg/dL 1 00  < > 1 32*   CALCIUM mg/dL 8 9  < > 8 7   TOTAL PROTEIN g/dL  --   --  7 2   BILIRUBIN TOTAL mg/dL  --   --  0 97   ALK PHOS U/L  --   --  70   ALT U/L  --   --  46   AST U/L  --   --  33   GLUCOSE RANDOM mg/dL 79  < > 151*   < > = values in this interval not displayed      Results from last 7 days  Lab Units 08/13/18  0443   SODIUM mmol/L 136   POTASSIUM mmol/L 5 2   CHLORIDE mmol/L 103   CO2 mmol/L 25   BUN mg/dL 23   CREATININE mg/dL 1 00   GLUCOSE RANDOM mg/dL 79   CALCIUM mg/dL 8 9       Results from last 7 days  Lab Units 08/10/18  0426   CHOLESTEROL mg/dL 99         Medications:    Current Facility-Administered Medications:     acetaminophen (TYLENOL) tablet 650 mg, 650 mg, Oral, Q6H PRN, Westley Han MD    apixaban (ELIQUIS) tablet 5 mg, 5 mg, Oral, BID, Rujul Hull, DO, 5 mg at 08/13/18 0840    benzonatate (TESSALON PERLES) capsule 100 mg, 100 mg, Oral, TID PRN, Westley Han MD, 100 mg at 08/13/18 0446    furosemide (LASIX) tablet 20 mg, 20 mg, Oral, Daily, Rujul Hull, DO, 20 mg at 08/13/18 0840    lidocaine (LIDODERM) 5 % patch 1 patch, 1 patch, Transdermal, Daily, Westley Han MD, Stopped at 08/13/18 0842    lisinopril (ZESTRIL) tablet 5 mg, 5 mg, Oral, Daily, Rujul Hull, DO, 5 mg at 08/13/18 0840    LORazepam (ATIVAN) 2 mg/mL injection 0 5 mg, 0 5 mg, Intravenous, Q4H PRN, Westley Han MD    melatonin tablet 6 mg, 6 mg, Oral, HS PRN, Rosa Lennox Spatzer, CRNP, 6 mg at 08/10/18 2133    metoprolol tartrate (LOPRESSOR) tablet 50 mg, 50 mg, Oral, Q12H [address], Rujul Hull, DO, 50 mg at 08/13/18 0841    ondansetron (ZOFRAN) injection 4 mg, 4 mg, Intravenous, Q6H PRN, Westley Han MD, 4 mg at 08/09/18 2029    potassium chloride (K-DUR,KLOR-CON) CR tablet 20 mEq, 20 mEq, Oral, Daily, Westley Han MD, 20 mEq at 08/13/18 0841    thiamine (VITAMIN B1) tablet 100 mg, 100 mg, Oral, Daily, Westley Han MD, 100 mg at 08/13/18 0840   vitamin E (tocopherol) capsule 400 Units, 400 Units, Oral, Daily, Magen Gilman MD, 400 Units at 08/13/18 0840    zinc gluconate tablet 100 mg, 100 mg, Oral, Daily, Magen Gilman MD, 100 mg at 08/13/18 0841    Portions of the record may have been created with voice recognition software  Occasional wrong word or "sound a like" substitutions may have occurred due to the inherent limitations of voice recognition software  Read the chart carefully and recognize, using context, where substitutions have occurred

## 2018-08-13 NOTE — SOCIAL WORK
CM informed by Dr Nathan Montano that patient will need a Lifevest  CM faxed Facesheet, H&P, Echo and Stress and Dr Navarro Irving note to Elicia Taylor at [facility] today  CM called Elicia Taylor to inform patient is ready for discharge and will need to be fitted prior to going home, he states, "it's a 50/50 chance of getting him fitted today "    CM spoke with Carlos Manuel Grewal PA-C to make aware, also asked Hillary Beltrán RN to page Jerrell Dotson if patient get's fitted kumar Heart RN

## 2018-08-14 VITALS
DIASTOLIC BLOOD PRESSURE: 100 MMHG | TEMPERATURE: 97.4 F | OXYGEN SATURATION: 92 % | SYSTOLIC BLOOD PRESSURE: 150 MMHG | WEIGHT: 132.06 LBS | RESPIRATION RATE: 18 BRPM | HEIGHT: 72 IN | HEART RATE: 63 BPM | BODY MASS INDEX: 17.89 KG/M2

## 2018-08-14 PROBLEM — I42.9 CARDIOMYOPATHY (HCC): Status: ACTIVE | Noted: 2018-08-09

## 2018-08-14 PROBLEM — I48.91 ATRIAL FIBRILLATION WITH RAPID VENTRICULAR RESPONSE (HCC): Status: RESOLVED | Noted: 2018-08-09 | Resolved: 2018-08-14

## 2018-08-14 LAB
CHEST PAIN STATEMENT: NORMAL
MAX DIASTOLIC BP: 95 MMHG
MAX HEART RATE: 125 BPM
MAX PREDICTED HEART RATE: 144 BPM
MAX. SYSTOLIC BP: 141 MMHG
PROTOCOL NAME: NORMAL
REASON FOR TERMINATION: NORMAL
TARGET HR FORMULA: NORMAL
TEST INDICATION: NORMAL
TIME IN EXERCISE PHASE: NORMAL

## 2018-08-14 PROCEDURE — 99239 HOSP IP/OBS DSCHRG MGMT >30: CPT | Performed by: PHYSICIAN ASSISTANT

## 2018-08-14 PROCEDURE — 99233 SBSQ HOSP IP/OBS HIGH 50: CPT | Performed by: INTERNAL MEDICINE

## 2018-08-14 RX ORDER — LISINOPRIL 10 MG/1
10 TABLET ORAL DAILY
Qty: 30 TABLET | Refills: 0 | Status: SHIPPED | OUTPATIENT
Start: 2018-08-15 | End: 2018-08-22 | Stop reason: SINTOL

## 2018-08-14 RX ORDER — DIGOXIN 125 MCG
125 TABLET ORAL DAILY
Status: DISCONTINUED | OUTPATIENT
Start: 2018-08-15 | End: 2018-08-14

## 2018-08-14 RX ORDER — FUROSEMIDE 20 MG/1
20 TABLET ORAL DAILY
Qty: 30 TABLET | Refills: 0 | Status: SHIPPED | OUTPATIENT
Start: 2018-08-15 | End: 2018-08-22 | Stop reason: SDUPTHER

## 2018-08-14 RX ORDER — METOPROLOL TARTRATE 100 MG/1
100 TABLET ORAL EVERY 12 HOURS SCHEDULED
Status: DISCONTINUED | OUTPATIENT
Start: 2018-08-14 | End: 2018-08-14 | Stop reason: HOSPADM

## 2018-08-14 RX ORDER — LIDOCAINE 50 MG/G
1 PATCH TOPICAL DAILY
Qty: 30 PATCH | Refills: 0 | Status: SHIPPED | OUTPATIENT
Start: 2018-08-15

## 2018-08-14 RX ORDER — METOPROLOL TARTRATE 100 MG/1
100 TABLET ORAL EVERY 12 HOURS SCHEDULED
Qty: 60 TABLET | Refills: 0 | Status: SHIPPED | OUTPATIENT
Start: 2018-08-14 | End: 2018-08-17 | Stop reason: SDUPTHER

## 2018-08-14 RX ORDER — DIGOXIN 125 MCG
250 TABLET ORAL ONCE
Status: DISCONTINUED | OUTPATIENT
Start: 2018-08-14 | End: 2018-08-14

## 2018-08-14 RX ADMIN — VITAMIN E CAP 400 UNIT 400 UNITS: 400 CAP at 09:22

## 2018-08-14 RX ADMIN — LISINOPRIL 10 MG: 10 TABLET ORAL at 09:23

## 2018-08-14 RX ADMIN — Medication 100 MG: at 09:22

## 2018-08-14 RX ADMIN — APIXABAN 5 MG: 5 TABLET, FILM COATED ORAL at 09:22

## 2018-08-14 RX ADMIN — METOPROLOL TARTRATE 75 MG: 50 TABLET ORAL at 09:23

## 2018-08-14 RX ADMIN — POTASSIUM CHLORIDE 10 MEQ: 750 TABLET, EXTENDED RELEASE ORAL at 09:23

## 2018-08-14 RX ADMIN — FUROSEMIDE 20 MG: 80 TABLET ORAL at 09:23

## 2018-08-14 RX ADMIN — Medication 100 MG: at 09:24

## 2018-08-14 NOTE — PROGRESS NOTES
Pt and pt's wife requested a scale, as per a conversation they had with cardiology  RN spoke with Shawn Mcarthur from the CHF team and she instructed RN to get scale from conference room and was given to pt    RN also spoke with Austin Crowe for scripts lidoderm patches

## 2018-08-14 NOTE — ASSESSMENT & PLAN NOTE
Improved w/in lenient control range  Appreciate cardiology recommendations  pt was previously on digoxin and switched to lopressor 100mg bid  Started eliquis for [facility]  May need op cardioversion vs consideration for reinitiation of digoxin  F/u with vickilotta as scheduled in one week

## 2018-08-14 NOTE — NURSING NOTE
Reviewed and discussed discharge instructions with patient  Reviewed and discussed pt's medications and educated pt on eliquis side effects and precaustions    Reviewed and discussed with pt to check bp daily and weigh himself daily  Discussed with pt f/u appts

## 2018-08-14 NOTE — PROGRESS NOTES
[address] Internal Medicine Progress Note  Patient: Harmony Mackenzie 70 y o  male   MRN: [id number]  PCP: Germania Florez DO  Unit/Bed#: E4 -01 Encounter: [id number]  Date Of Visit: 08/13/18    Assessment:    Principal Problem:    Atrial fibrillation with rapid ventricular response (HCC)  Active Problems:    Chronic obstructive pulmonary disease (HCC)    Rib pain on right side    Anxiety    Acute diastolic heart failure (HCC)    Restless leg syndrome, uncontrolled      Plan:    1-paroxysmal atrial fibrillation with rapid ventricular rate-the patient presented with palpitations and heart rate of around 140   This is now rate controlled   Echocardiogram reveals severe tricuspid regurg with right ventricular dilatation moderate   Patient on metoprolol tartrate which has been increased by cardiology to 75 mg BID  Lisinopril added 8/11 2018  Patient agreeable to Eliquis which has been started during this admission       2-acute diastolic heart failure- symptoms had improved with IV diuresis   This was held  secondary to low blood pressure  But was able to resume diuretic therapy today with oral lasix 20 mg daily  Will continue to monitor input output and daily weights       3-COPD without exacerbation-continue home regimen      4-anxiety-the patient states that he gets more anxious when admitted to hospital-but has remained stable so far      5-acute renal failure- Presenting with a Cr of 1 32 -->1  45   Baseline creatinine is around 1 0  Current creatinine has returned to baseline      6-restless leg-no issues since admission  Advised to follow up with PCP if becomes persistent  7-cardiomyopathy: LVEF noted to be 23% on stress echo  Recommendation by Cardiology for life vest to protect against ventricular arrhythmias  Patient was fitted this evening and is okay for discharge tomorrow  8- right-sided back pain:  Patient notes intermittent right-sided back pain   He has had relief with lidocaine patch provided here   Requesting script upon discharge  VTE Pharmacologic Prophylaxis:   Pharmacologic: Apixaban (Eliquis)  Mechanical VTE Prophylaxis in Place: Yes    Discharge Plan: Life vest fitting to occur today and expected d c tomorrow  Discussions with Specialists or Other Care Team Provider: Dr Jeannine Hdz, nursing    Education and Discussions with Family / Patient: patient    Time Spent for Care: 20 minutes  More than 50% of total time spent on counseling and coordination of care as described above  Current Length of Stay: 4 day(s)  Current Patient Status: Inpatient   Code Status: Level 1 - Full Code    Subjective:   Sitting in chair at bedside  Patient was having a conversation on the phone his wife  She was placed on speaker phone to partake in discussion  Patient feels well at this time  Discussed current medication regimen  Discussed importance of Life Vest   Patient is eager to be discharged home tomorrow  Wife will provide transportation upon discharge  No chest pain, no chest pressure, no palpitations  No shortness of breath, abdominal pain, fevers or chills  Objective:     Vitals:   Temp (24hrs), Av °F (36 7 °C), Min:97 5 °F (36 4 °C), Max:98 3 °F (36 8 °C)    HR:  [] 85  Resp:  [18-20] 18  BP: (119-140)/(71-93) 119/93  SpO2:  [92 %-95 %] 95 %  Body mass index is 17 91 kg/m²  Input and Output Summary (last 24 hours):     No intake or output data in the 24 hours ending 18 9147    Physical Exam:     Physical Exam   Constitutional: He is oriented to person, place, and time  No distress  Frail and cachectic-appearing   HENT:   Head: Normocephalic and atraumatic  Eyes: Conjunctivae are normal    Cardiovascular: Normal rate and normal heart sounds  Pulmonary/Chest: Effort normal and breath sounds normal  No respiratory distress  He has no wheezes  He has no rales  He exhibits no tenderness  Abdominal: Soft   Bowel sounds are normal  He exhibits no distension and no mass  There is no tenderness  There is no rebound and no guarding  Musculoskeletal: He exhibits no edema  Neurological: He is alert and oriented to person, place, and time  Skin: Skin is warm and dry  He is not diaphoretic  Psychiatric: He has a normal mood and affect  His behavior is normal  Judgment and thought content normal    Nursing note and vitals reviewed  Additional Data:     Labs:      Results from last 7 days  Lab Units 08/09/18  1104   WBC Thousand/uL 8 68   HEMOGLOBIN g/dL 15 0   HEMATOCRIT % 44 9   PLATELETS Thousands/uL 125*   NEUTROS PCT % 69   LYMPHS PCT % 24   MONOS PCT % 7   EOS PCT % 1       Results from last 7 days  Lab Units 08/13/18  0443  08/09/18  1104   SODIUM mmol/L 136  < > 139   POTASSIUM mmol/L 5 2  < > 3 7   CHLORIDE mmol/L 103  < > 104   CO2 mmol/L 25  < > 30   BUN mg/dL 23  < > 24   CREATININE mg/dL 1 00  < > 1 32*   CALCIUM mg/dL 8 9  < > 8 7   TOTAL PROTEIN g/dL  --   --  7 2   BILIRUBIN TOTAL mg/dL  --   --  0 97   ALK PHOS U/L  --   --  70   ALT U/L  --   --  46   AST U/L  --   --  33   GLUCOSE RANDOM mg/dL 79  < > 151*   < > = values in this interval not displayed  * I Have Reviewed All Lab Data Listed Above  * Additional Pertinent Lab Tests Reviewed:  [address] Admission Reviewed    Imaging:    Imaging Reports Reviewed Today Include:   Imaging Personally Reviewed by Myself Includes:      Recent Cultures (last 7 days):           Last 24 Hours Medication List:     Current Facility-Administered Medications:  acetaminophen 650 mg Oral Q6H PRN Westley Han MD   apixaban 5 mg Oral BID Rujul Hull, DO   benzonatate 100 mg Oral TID PRN Westley Han MD   furosemide 20 mg Oral Daily Rujul Hull, DO   lidocaine 1 patch Transdermal Daily Westley Han MD   [START ON 8/14/2018] lisinopril 10 mg Oral Daily Donette Olszewski, MD   LORazepam 0 5 mg Intravenous Q4H PRN Westley Han MD   melatonin 6 mg Oral HS PRN Alberto Gregory CRNP   metoprolol tartrate 75 mg Oral Q12H [address] Gabriela Sampson MD   ondansetron 4 mg Intravenous Q6H PRN Luis Eduardo Wallace MD   [START ON 8/14/2018] potassium chloride 10 mEq Oral Daily Gabriela Sampson MD   vitamin B-1 100 mg Oral Daily Luis Eduardo Wallace MD   vitamin E (tocopherol) 400 Units Oral Daily Luis Eduardo Wallace MD   zinc gluconate 100 mg Oral Daily Luis Eduardo Wallace MD        Today, Patient Was Seen By: Silviano Garcia PA-C    ** Please Note: This note has been constructed using a voice recognition system   **

## 2018-08-14 NOTE — DISCHARGE INSTRUCTIONS
A-fib (Atrial Fibrillation)   WHAT YOU NEED TO KNOW:   A-fib may come and go, or it may be a long-term condition  A-fib can cause blood clots, stroke, or heart failure  These conditions may become life-threatening  It is important to treat and manage a-fib to help prevent a blood clot, stroke, or heart failure  DISCHARGE INSTRUCTIONS:   Call 911 for any of the following:   · You have any of the following signs of a heart attack:      ¨ Squeezing, pressure, or pain in your chest that lasts longer than 5 minutes or returns    ¨ Discomfort or pain in your back, neck, jaw, stomach, or arm     ¨ Trouble breathing    ¨ Nausea or vomiting    ¨ Lightheadedness or a sudden cold sweat, especially with chest pain or trouble breathing    · You have any of the following signs of a stroke:      ¨ Numbness or drooping on one side of your face     ¨ Weakness in an arm or leg    ¨ Confusion or difficulty speaking    ¨ Dizziness, a severe headache, or vision loss  Seek care immediately if:  You have any of the following signs of a blood clot:  · You feel lightheaded, are short of breath, and have chest pain  · You cough up blood  · You have swelling, redness, pain, or warmth in your arm or leg  Contact your cardiologist or healthcare provider if:   · Your heart rate is higher than your healthcare provider said it should be  · You have new or worsening swelling in your legs, feet, ankles, or abdomen  · You are short of breath, even at rest      · You have questions or concerns about your condition or care  Medicines: You may need any of the following:  · Heart medicines  help control your heart rate and rhythm  You may need more than one medicine to treat your symptoms  · Blood thinners    help prevent blood clots  Examples of blood thinners include heparin and warfarin  Clots can cause strokes, heart attacks, and death   The following are general safety guidelines to follow while you are taking a blood thinner:    ¨ Watch for bleeding and bruising while you take blood thinners  Watch for bleeding from your gums or nose  Watch for blood in your urine and bowel movements  Use a soft washcloth on your skin, and a soft toothbrush to brush your teeth  This can keep your skin and gums from bleeding  If you shave, use an electric shaver  Do not play contact sports  ¨ Tell your dentist and other healthcare providers that you take anticoagulants  Wear a bracelet or necklace that says you take this medicine  ¨ Do not start or stop any medicines unless your healthcare provider tells you to  Many medicines cannot be used with blood thinners  ¨ Tell your healthcare provider right away if you forget to take the medicine, or if you take too much  ¨ Warfarin  is a blood thinner that you may need to take  The following are things you should be aware of if you take warfarin  § Foods and medicines can affect the amount of warfarin in your blood  Do not make major changes to your diet while you take warfarin  Warfarin works best when you eat about the same amount of vitamin K every day  Vitamin K is found in green leafy vegetables and certain other foods  Ask for more information about what to eat when you are taking warfarin  § You will need to see your healthcare provider for follow-up visits when you are on warfarin  You will need regular blood tests  These tests are used to decide how much medicine you need  · Antiplatelets , such as aspirin, help prevent blood clots  Take your antiplatelet medicine exactly as directed  These medicines make it more likely for you to bleed or bruise  If you are told to take aspirin, do not take acetaminophen or ibuprofen instead  · Take your medicine as directed  Contact your healthcare provider if you think your medicine is not helping or if you have side effects  Tell him or her if you are allergic to any medicine   Keep a list of the medicines, vitamins, and herbs you take  Include the amounts, and when and why you take them  Bring the list or the pill bottles to follow-up visits  Carry your medicine list with you in case of an emergency  Follow up with your cardiologist as directed: You will need regular blood tests and monitoring  Write down your questions so you remember to ask them during your visits  Manage A-fib:   · Know your target heart rate  Learn how to take your pulse and monitor your heart rate  · Manage other health conditions  This includes high blood pressure, sleep apnea, thyroid disease, diabetes, and other heart conditions  Take medicine as directed and follow your treatment plan  · Limit or do not drink alcohol  Alcohol can make a-fib hard to manage  Ask your healthcare provider if it is safe for you to drink alcohol  A drink of alcohol is 12 ounces of beer, 5 ounces of wine, or 1½ ounces of liquor  · Do not smoke  Nicotine and other chemicals in cigarettes and cigars can cause heart and lung damage  Ask your healthcare provider for information if you currently smoke and need help to quit  E-cigarettes or smokeless tobacco still contain nicotine  Talk to your healthcare provider before you use these products  · Eat heart-healthy foods  Heart healthy foods will help keep your cholesterol low  These include fruits, vegetables, whole-grain breads, low-fat dairy products, beans, lean meats, and fish  Replace butter and margarine with heart-healthy oils such as olive oil and canola oil  · Maintain a healthy weight  Ask your healthcare provider how much you should weigh  Ask him to help you create a weight loss plan if you are overweight  · Exercise for 30 minutes  most days of the week  Ask your healthcare provider about the best exercise plan for you  © 2017 [employer] Information is for End User's use only and may not be sold, redistributed or otherwise used for commercial purposes   All illustrations and images included in CareNotes® are the copyrighted property of [employer]  or [employer]  The above information is an  only  It is not intended as medical advice for individual conditions or treatments  Talk to your doctor, nurse or pharmacist before following any medical regimen to see if it is safe and effective for you

## 2018-08-14 NOTE — DISCHARGE SUMMARY
Discharge- Darien Dumont 1940, 70 y o  male MRN: [id number]    Unit/Bed#: E4 -01 Encounter: [id number]    Primary Care Provider: Shaji Carballo DO   Date and time admitted to hospital: 8/9/2018 10:44 AM    Discharge Summary - [facility] Internal Medicine    Patient Information: Darien Dumont 70 y o  male MRN: [id number]  Unit/Bed#: E4 -01 Encounter: [id number]    Discharging Physician / Practitioner: Abelardo Myles PA-C  PCP: Shaji Carballo DO  Admission Date: 8/9/2018  Discharge Date: 08/14/18    Disposition:     Home    Reason for Admission: a fib w/rvr    Discharge Diagnoses:     Principal Problem:    Atrial fibrillation with rapid ventricular response (HCC)  Active Problems:    Chronic obstructive pulmonary disease (HCC)    Rib pain on right side    Anxiety    Acute diastolic heart failure (HCC)    Restless leg syndrome, uncontrolled  Resolved Problems:    * No resolved hospital problems  *      Consultations During Hospital Stay:  · cardiology    Procedures Performed:     ·     Significant Findings / Test Results:        Cardiomediastinal silhouette appears unremarkable      Small right basilar atelectasis/infiltrate  Trace bilateral pleural effusions      Stable osseous structures  Stable right rib deformities      IMPRESSION:     Small right basilar atelectasis/infiltrate  Trace bilateral pleural effusions            Workstation performed    LEFT VENTRICLE:  The ventricle was mildly dilated  Systolic function was moderately to markedly reduced  Ejection fraction was estimated to be 30 %  There was moderate-severe diffuse hypokinesis      RIGHT VENTRICLE:  The ventricle was moderately dilated    Systolic function was mildly to moderately reduced      LEFT ATRIUM:  The atrium was moderately dilated      RIGHT ATRIUM:  The atrium was markedly dilated      MITRAL VALVE:  There was mild regurgitation      TRICUSPID VALVE:  There was severe regurgitation      AORTA:  The root exhibited mild fibrocalcific change      IVC, HEPATIC VEINS:  The inferior vena cava was dilated  Respirophasic changes were blunted (less than 50% variation)      PULMONARY ARTERIES:  Systolic pressure was within the normal range  Estimated peak pressure was 20 mmHg      HISTORY: PRIOR HISTORY: anxiety Congestive heart failure  Atrial fibrillation  Chronic lung disease      PROCEDURE: The procedure was performed at the bedside  This was a routine study  The transthoracic approach was used  The study included complete 2D imaging, M-mode, complete spectral Doppler, and color Doppler  The heart rate was 90 bpm,  at the start of the study  This was a technically difficult study      LEFT VENTRICLE: The ventricle was mildly dilated  Systolic function was moderately to markedly reduced  Ejection fraction was estimated to be 30 %  There was moderate-severe diffuse hypokinesis  Wall thickness was normal  DOPPLER:  Transmitral flow pattern: atrial fibrillation  The study was not technically sufficient to allow evaluation of LV diastolic function      VENTRICULAR SEPTUM: There was diastolic flattening  These changes are consistent with RV volume overload      RIGHT VENTRICLE: The ventricle was moderately dilated  Systolic function was mildly to moderately reduced      LEFT ATRIUM: The atrium was moderately dilated      RIGHT ATRIUM: The atrium was markedly dilated      MITRAL VALVE: Valve structure was normal  There was normal leaflet separation  DOPPLER: The transmitral velocity was within the normal range  There was no evidence for stenosis  There was mild regurgitation      AORTIC VALVE: The valve was trileaflet  Leaflets exhibited normal thickness and normal cuspal separation  DOPPLER: Transaortic velocity was within the normal range  There was no evidence for stenosis  There was no regurgitation      TRICUSPID VALVE: The annulus was dilated   DOPPLER: There was severe regurgitation      PULMONIC VALVE: Not well visualized      PERICARDIUM: There was no pericardial effusion  The pericardium was normal in appearance      AORTA: The root exhibited mild fibrocalcific change  Not well visualized      SYSTEMIC VEINS: IVC: The inferior vena cava was dilated  Respirophasic changes were blunted (less than 50% variation)      PULMONARY ARTERY: DOPPLER: Systolic pressure was within the normal range  Estimated peak pressure was 20 mmHg      SYSTEM MEASUREMENT TABLES     2D  AV Diam: 2 7 cm  EF Biplane: 44 5 %  LA Area: 27 6 cm2  LA Diam: 3 8 cm  LVEDV MOD A2C: 56 7 ml  LVEDV MOD A4C: 67 9 ml  LVEDV MOD BP: 64 5 ml  LVEF MOD A2C: 39 2 %  LVEF MOD A4C: 49 9 %  LVESV MOD A2C: 34 5 ml  LVESV MOD A4C: 34 ml  LVESV MOD BP: 35 8 ml  LVLd A2C: 6 9 cm  LVLd A4C: 7 5 cm  LVLs A2C: 6 4 cm  LVLs A4C: 7 cm  RA Area: 31 2 cm2  Rv Diam: 3 9 cm  SV MOD A2C: 22 2 ml  SV MOD A4C: 33 9 ml     CW  TR Vmax: 1 2 m/s  TR maxP 9 mmHg       PROCEDURE: The study was performed in the the Stress lab  A regadenoson infusion pharmacologic stress test was performed  Gated SPECT myocardial perfusion imaging was performed after stress  Systolic blood pressure was 141 mmHg, at the  start of the study  Diastolic blood pressure was 95 mmHg, at the start of the study  The heart rate was 97 bpm, at the start of the study  IV double checked  Regadenoson protocol:  HR bpm SBP mmHg DBP mmHg Symptoms  Baseline 97 141 95 none  2 min 125 111 74 none  5 min 113 129 91 none  Walked three min after Regadenoson injected The patient also performed low level exercise      STRESS SUMMARY: Duration of pharmacologic stress was Maximal heart rate during stress was 125 bpm  There was resting hypertension with an appropriate blood pressure response to stress  The rate-pressure product for the peak heart rate and  blood pressure was 77516  There was no chest pain during stress  The stress test was terminated due to protocol completion  Pre oxygen saturation: 96 %   Peak oxygen saturation: 93 %  Arrhythmia during stress: isolated premature ventricular  beats      ISOTOPE ADMINISTRATION:  Resting isotope administration Stress isotope administration  Agent Tetrofosmin Tetrofosmin  Dose 11 mCi 30 4 mCi  Date 08/13/2018 08/13/2018  Injection time 10:17 12:00  Imaging time 10:47 13:20  Injection-image interval 30 min 80 min     The radiopharmaceutical was injected at the peak effect of pharmacologic stress      MYOCARDIAL PERFUSION IMAGING:  The image quality was good  Left ventricular size was normal  The TID ratio was 1  17      PERFUSION DEFECTS:  -  There were no perfusion defects      GATED SPECT:  The calculated left ventricular ejection fraction was 23 %  Left ventricular ejection fraction was markedly decreased by visual estimate  There was severe global left ventricular hypokinesis      SUMMARY:  -  Stress results: There was resting hypertension with an appropriate blood pressure response to stress  There was no chest pain during stress  -  Perfusion imaging: There were no perfusion defects   -  Gated SPECT: The calculated left ventricular ejection fraction was 23 %  Left ventricular ejection fraction was markedly decreased by visual estimate  There was severe global left ventricular hypokinesis      IMPRESSIONS: Abnormal study after vasodilation and low level exercise  There is left ventricular systolic dysfunction without evidence of ischemia  Myocardial perfusion imaging was normal at rest and with stress  Incidental Findings:   ·      Test Results Pending at Discharge (will require follow up):   ·      Outpatient Tests Requested:  ·     Complications:      Hospital Course:     Rose De Santiago is a 70 y o  male patient who originally presented to the hospital on 8/9/2018 sent in by his cardiologist worsening shortness of breath and leg swelling and a 10 lb weight gain  He has a history of paroxysmal atrial fibrillation-and has done poorly on digoxin and diltiazem    He is currently on [place] 150 mg daily  The patient had refused anticoagulation in the past   In March of this year he developed persistent cough which was felt to be viral   Subsequently he developed orthopnea and a diagnosis of CHF was made based on x-ray and elevated BNP  Once he was started on diuresis his symptoms improved  In the ED was noted to be in AFib with rapid ventricular rate and evidence of congestive heart failure  He was started on a diltiazem drip with which his heart rate has trended down to around 100 to 120  He also complains of a lot of anxiety and worsening restless leg  In addition patient also complains of persistent rib pain on the right side-x-ray did not reveal any fractures  He denies any chest pain  Hospital stay by problem list below        Acute diastolic heart failure ([facility] )   Assessment & Plan    S/p IV lasix now back on home lasix  Stress echo w/EF 23% w/o evidence of ischemia and severe TR  Felt likely 2* tachycardia  D/c'd on life vest, lopressor, lisinopril and lasix  Likely aicd in 3-4 mo w/op f/u with cardiology in one week        Rib pain on right side   Assessment & Plan    Troponin was negative  Continue lidocaine patch        Chronic obstructive pulmonary disease ([facility] )   Assessment & Plan    W/no acute exacerbation, can continue tessalon 100mg tid prn        * Atrial fibrillation with rapid ventricular response (HCC)   Assessment & Plan    Improved w/in lenient control range  Appreciate cardiology recommendations  pt was previously on digoxin and switched to lopressor 100mg bid  Started eliquis for [facility]  May need op cardioversion vs consideration for reinitiation of digoxin  F/u with gulotta as scheduled in one week                Condition at Discharge: good     Discharge Day Visit / Exam:     Subjective:  Patient seen and examined  Wife at bedside  No events overnight per nursing    Patient reports that his swelling has improved and he has not been having any shortness of breath, chest pain or lightheadedness or palpitations  His wife reports that he does not complain of symptoms typically but she did notice that prior to his admission had dyspnea on exertion which has since improved  No dark black stools or bright red blood  Vitals: Blood Pressure: 150/100 (08/14/18 0734)  Pulse: 63 (08/14/18 0734)  Temperature: (!) 97 4 °F (36 3 °C) (08/14/18 0734)  Temp Source: Tympanic (08/14/18 0734)  Respirations: 18 (08/14/18 0734)  Height: 6' (182 9 cm) (08/09/18 1420)  Weight - Scale: 59 9 kg (132 lb 0 9 oz) (08/13/18 0600)  SpO2: 92 % (08/14/18 0734)  Exam:   Physical Exam   Constitutional: He appears well-developed  No distress  Thin, stated age, no apparent distress   HENT:   Head: Normocephalic and atraumatic  Right Ear: External ear normal    Left Ear: External ear normal    Eyes: Right eye exhibits no discharge  Left eye exhibits no discharge  No scleral icterus  Neck: Normal range of motion  Cardiovascular: Exam reveals no gallop and no friction rub  No murmur heard  Irregularly irregular   Pulmonary/Chest: Effort normal  No respiratory distress  He has no wheezes  He has no rales  Abdominal: Soft  He exhibits no distension  There is no tenderness  There is no rebound and no guarding  Musculoskeletal: He exhibits no edema  Neurological: He is alert  Skin: Skin is warm and dry  He is not diaphoretic  Psychiatric: He has a normal mood and affect  Vitals reviewed  (  Be Sure to Include Physical Exam: Delete this entire line when you have entered your exam)  Discussion with Family: f/u needs    Discharge instructions/Information to patient and family:   See after visit summary for information provided to patient and family  Provisions for Follow-Up Care:  See after visit summary for information related to follow-up care and any pertinent home health orders        Planned Readmission: none     Discharge Statement:  I spent 45 minutes discharging the patient  This time was spent on the day of discharge  I had direct contact with the patient on the day of discharge  Greater than 50% of the total time was spent examining patient, answering all patient questions, arranging and discussing plan of care with patient as well as directly providing post-discharge instructions  Additional time then spent on discharge activities  Discharge Medications:  See after visit summary for reconciled discharge medications provided to patient and family        ** Please Note: This note has been constructed using a voice recognition system **

## 2018-08-14 NOTE — ASSESSMENT & PLAN NOTE
S/p IV lasix now back on home lasix  Stress echo w/EF 23% w/o evidence of ischemia and severe TR  Felt likely 2* tachycardia  D/c'd on life vest, lopressor, lisinopril and lasix  Likely aicd in 3-4 mo w/op f/u with cardiology in one week

## 2018-08-14 NOTE — PROGRESS NOTES
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---no infarct or ischemia on SPECT imaging 8/13/18  3  Acute systolic/diastolic congestive heart failure  4  Severe tricuspid regurgitation with moderate right ventricular dilatation    · AF persistent w/ minimal RVR this AM, mostly w/ ambulation  Will augment metoprolol from 75 BID to 100 po BID  Pt's wife has refused digoxin  Can consider in future if poor rate control in OP setting w/ possible VLADISLAV/CV  No CCB in light of cardiomyopathy  · OK to d/c from cardiac standpoint  · Continue Eliquis 5 po BID, lisinopril 10 mg po daily, furosemide 20 po daily  · SPECT w/out evidence of prior infarct or ongoing ischemia, therefore suspect CM is tachy-mediated  · D/C on LifeVest   AICD in 3-4 months if no improvement of LVEF w/ adequate rate control  · F/U with Dr Lluvia Hendrix 8/22 as scheduled        Subjective: Pt seen/examined    Feels well, wants to go home, no SOB, CP, dizziness, LE edema, orthpnea      Physical Exam:  GEN:  NAD  HEENT:  MMM, NCAT, pink conjunctiva, EOMI, nonicteric sclera  CV:  NO JVD/HJR, RR, NO M/R/G, +S1/S2, NO PARASTERNAL HEAVE/THRILL, NO LE EDEMA, NO HEPATIC SYSTOLIC PULSATION, WARM EXTREMITIES  RESP:  CTAB/L  ABD:  SOFT, NT, NO GROSS ORGANOMEGALY        Vitals:   /100 (BP Location: Right arm)   Pulse 63   Temp (!) 97 4 °F (36 3 °C) (Tympanic)   Resp 18   Ht 6' (1 829 m)   Wt 59 9 kg (132 lb 0 9 oz)   SpO2 92%   BMI 17 91 kg/m²   Vitals:    08/13/18 0433 08/13/18 0600   Weight: 59 9 kg (132 lb 0 9 oz) 59 9 kg (132 lb 0 9 oz)       Intake/Output Summary (Last 24 hours) at 08/14/18 0937  Last data filed at 08/13/18 2151   Gross per 24 hour   Intake              520 ml   Output                0 ml   Net 520 ml       TELEMETRY: AF, mild RVR  Lab Results:    Results from last 7 days  Lab Units 08/09/18  1104   WBC Thousand/uL 8 68   HEMOGLOBIN g/dL 15 0   HEMATOCRIT % 44 9   PLATELETS Thousands/uL 125*       Results from last 7 days  Lab Units 08/13/18  0443  08/09/18  1104   SODIUM mmol/L 136  < > 139   POTASSIUM mmol/L 5 2  < > 3 7   CHLORIDE mmol/L 103  < > 104   CO2 mmol/L 25  < > 30   BUN mg/dL 23  < > 24   CREATININE mg/dL 1 00  < > 1 32*   CALCIUM mg/dL 8 9  < > 8 7   TOTAL PROTEIN g/dL  --   --  7 2   BILIRUBIN TOTAL mg/dL  --   --  0 97   ALK PHOS U/L  --   --  70   ALT U/L  --   --  46   AST U/L  --   --  33   GLUCOSE RANDOM mg/dL 79  < > 151*   < > = values in this interval not displayed      Results from last 7 days  Lab Units 08/13/18  0443   SODIUM mmol/L 136   POTASSIUM mmol/L 5 2   CHLORIDE mmol/L 103   CO2 mmol/L 25   BUN mg/dL 23   CREATININE mg/dL 1 00   GLUCOSE RANDOM mg/dL 79   CALCIUM mg/dL 8 9       Results from last 7 days  Lab Units 08/10/18  0426   CHOLESTEROL mg/dL 99       Medications:    Current Facility-Administered Medications:     acetaminophen (TYLENOL) tablet 650 mg, 650 mg, Oral, Q6H PRN, Daya Aguirre MD    apixaban (ELIQUIS) tablet 5 mg, 5 mg, Oral, BID, Rujul Hull, DO, 5 mg at 08/14/18 3091    benzonatate (TESSALON PERLES) capsule 100 mg, 100 mg, Oral, TID PRN, Daya Aguirre MD, 100 mg at 08/13/18 0446    furosemide (LASIX) tablet 20 mg, 20 mg, Oral, Daily, Rujul Hull, DO, 20 mg at 08/14/18 0923    lidocaine (LIDODERM) 5 % patch 1 patch, 1 patch, Transdermal, Daily, Daya Aguirre MD, Stopped at 08/13/18 0842    lisinopril (ZESTRIL) tablet 10 mg, 10 mg, Oral, Daily, Etta Pathak MD, 10 mg at 08/14/18 0923    LORazepam (ATIVAN) 2 mg/mL injection 0 5 mg, 0 5 mg, Intravenous, Q4H PRN, Daya Aguirre MD    melatonin tablet 6 mg, 6 mg, Oral, HS PRN, Madolyn Grade Spatzer, CRNP, 6 mg at 08/10/18 2133    metoprolol tartrate (LOPRESSOR) tablet 75 mg, 75 mg, Oral, Q12H [facility], Remy Aponte MD, 75 mg at 08/14/18 0923    ondansetron [facility]) injection 4 mg, 4 mg, Intravenous, Q6H PRN, Vicente Alvarez MD, 4 mg at 08/09/18 2029    thiamine (VITAMIN B1) tablet 100 mg, 100 mg, Oral, Daily, Vicente Alvarez MD, 100 mg at 08/14/18 3898    vitamin E (tocopherol) capsule 400 Units, 400 Units, Oral, Daily, Vicente Alvarez MD, 400 Units at 08/14/18 7903    zinc gluconate tablet 100 mg, 100 mg, Oral, Daily, Vicente Alvarez MD, 100 mg at 08/14/18 2936    This note was completed in part utilizing M-[employer] Flu[employer] Direct Software  Grammatical errors, random word insertions, spelling mistakes, and incomplete sentences may be an occasional consequence of this system secondary to software limitations, ambient noise, and hardware issues  If you have any questions or concerns about the content, text, or information contained within the body of this dictation, please contact the provider for clarification

## 2018-08-16 ENCOUNTER — TELEPHONE (OUTPATIENT)
Dept: CARDIOLOGY CLINIC | Facility: CLINIC | Age: 78
End: 2018-08-16

## 2018-08-16 ENCOUNTER — TRANSITIONAL CARE MANAGEMENT (OUTPATIENT)
Dept: FAMILY MEDICINE CLINIC | Facility: CLINIC | Age: 78
End: 2018-08-16

## 2018-08-16 ENCOUNTER — PATIENT OUTREACH (OUTPATIENT)
Dept: CARDIOLOGY CLINIC | Facility: CLINIC | Age: 78
End: 2018-08-16

## 2018-08-16 NOTE — TELEPHONE ENCOUNTER
Pt's wife Amanda Black called and stated pt has severe diarrhea that started last night, also cold fingers  She is not sure which medication is causing this  BP's from last night are 128/80 HR 90 and weight of 131 lbs  Vitals from today are 140/90 HR 80 and weight is 132 lbs  She is making sure pt is getting enough fluids  Ani was informed Dr Che Howard is not in office this week, and was told to contact PCP  She agreed and also stated Jessy Campos has an appt  Tomorrow with PCP

## 2018-08-17 ENCOUNTER — OFFICE VISIT (OUTPATIENT)
Dept: FAMILY MEDICINE CLINIC | Facility: CLINIC | Age: 78
End: 2018-08-17
Payer: COMMERCIAL

## 2018-08-17 VITALS
BODY MASS INDEX: 18.8 KG/M2 | DIASTOLIC BLOOD PRESSURE: 80 MMHG | WEIGHT: 138.8 LBS | HEIGHT: 72 IN | SYSTOLIC BLOOD PRESSURE: 138 MMHG

## 2018-08-17 DIAGNOSIS — I48.91 ATRIAL FIBRILLATION, RAPID (HCC): ICD-10-CM

## 2018-08-17 DIAGNOSIS — I50.9 ACUTE CONGESTIVE HEART FAILURE, UNSPECIFIED HEART FAILURE TYPE (HCC): Primary | ICD-10-CM

## 2018-08-17 DIAGNOSIS — I48.21 PERMANENT ATRIAL FIBRILLATION (HCC): ICD-10-CM

## 2018-08-17 DIAGNOSIS — I42.9 CARDIOMYOPATHY, UNSPECIFIED TYPE (HCC): ICD-10-CM

## 2018-08-17 PROCEDURE — 99496 TRANSJ CARE MGMT HIGH F2F 7D: CPT | Performed by: FAMILY MEDICINE

## 2018-08-17 RX ORDER — METOPROLOL TARTRATE 100 MG/1
50 TABLET ORAL EVERY 12 HOURS SCHEDULED
Qty: 60 TABLET | Refills: 0
Start: 2018-08-17 | End: 2018-10-02 | Stop reason: SINTOL

## 2018-08-17 NOTE — PROGRESS NOTES
Transition of Care  Follow-up After Hospitalization    Barbie Neri 70 y o  male   Date:  8/17/2018    Date and time hospital follow up call was made:  8/16/2018 11:09 AM  Patient was hopsitalized at:  [facility]  Date of admission:  8/9/18  Date of discharge:  8/14/18  Diagnosis:  A FIB  Disposition:  Home  Were the patients medicaitons reviewed and updated:  No  Current symptoms:  None  Post hospital issues:  None  Should patient be enrolled in anticoag monitoring?:  No  Scheduled for follow up?:  Yes (Comment: DIANA 8/17/18)  Patients specialists:  Cardiologist  Cardiologist's Name:  DR Karen Monique  Did you obtain your prescribed medications:  Yes  Do you need help managing your perscriptions or medications:  No  Is transportation to your appointments needed:  No  I have advised the patient to call PCP with any new or worsening symptoms (please type in name along with any credentials):  eRd Juarez  Living Arrangements:  Spouse or Significiant other  Support System:  Spouse  The type of support provided:  Emotional, Physical  Do you have social support:  Yes, some  Are you recieving outpatient services:  No  Are you recieving home care services:  No  Are you using any community resources:  No  Current waiver service:  No  Have you fallen in the last 12 months:  No  Interperter language line required?:  No  Counseling:  Patient  Comments:  [facility] records were reviewed  Medications upon discharge reviewed/updated  Discharge Disposition:    Follow up visits with other specialists:       Assessment and Plan:    Bobbi Self was seen today for transition of care management  Diagnoses and all orders for this visit:    Acute congestive heart failure, unspecified heart failure type ([facility] [address] )    Cardiomyopathy, unspecified type ([facility] )    Permanent atrial fibrillation ([facility] )    Atrial fibrillation, rapid ([facility] [address] )  -     metoprolol tartrate (LOPRESSOR) 100 mg tablet;  Take 0 5 tablets (50 mg total) by mouth every 12 (twelve) hours      Will have Him restart  The Toprol 1st, but at 50 mg twice a day  The no restart the Eliquis, and then the lisinopril  Any side effects they will call us  They will see Cardiology next week  They will follow up here in 2-3 months or p r n  HPI:   Patient here today for DIANA  Patient denies any chest pain  Still feels winded with exertion  Patient was having diarrhea, which she feels is side effects from the medications that he was put on  He stopped them   2 days ago  He has not taken the metoprolol, Eliquis or lisinopril  He is still taking his Lasix  Patient's ejection fraction was 23% now has a life vest on,   Which she is currently wearing  Congestive Heart Failure   Presents for follow-up visit  Associated symptoms include edema and palpitations  Pertinent negatives include no chest pressure  The symptoms have been improving  Compliance with total regimen is 26-50%  Compliance problems include medication side effects  Compliance with medications is 26-50%  Side effects of treatment include GI discomfort (Diarrhea)  ROS: Review of Systems   Constitutional: Negative  Respiratory: Negative  Cardiovascular: Positive for palpitations  Gastrointestinal: Positive for diarrhea  Genitourinary: Negative          Past Medical History:   Diagnosis Date    Allergic rhinitis     last assessed [id number]    Atrial fibrillation [facility])     last assessed [id number]    Atypical chest pain     last assessed [id number]    Closed fracture of pubis ([facility] [address] )     Community acquired pneumonia     last assessed [id number]    COPD (chronic obstructive pulmonary disease) ([facility] [address] )     Nodule of right lung     last assessed [id number]    Pleural effusion     last assessed [id number]    Polyuria     last assessed [id number]    Traumatic pneumothorax        Past Surgical History:   Procedure Laterality Date    HERNIA REPAIR      TOOTH EXTRACTION         Social History Social History    Marital status: /Civil Union     Spouse name: N/A    Number of children: N/A    Years of education: N/A     Occupational History    retired      tractor trailor  and farmer     Social History Main Topics    Smoking status: Former Smoker     Types: Pipe, [address] date: 1996    Smokeless tobacco: Never Used    Alcohol use No    Drug use: No    Sexual activity: Not Asked     Other Topics Concern    None     Social History Narrative    Advance directive on file           Family History   Problem Relation Age of Onset    Diabetes Father        Allergies   Allergen Reactions    Anoro Ellipta [Umeclidinium-Vilanterol] Nausea Only     Heart burn, anal drainage    Origanum Oil          Current Outpatient Prescriptions:     apixaban (ELIQUIS) 5 mg, Take 1 tablet (5 mg total) by mouth 2 (two) times a day, Disp: 60 tablet, Rfl: 0    ascorbic acid with irene hips 1000 MG tablet, Take 500 mg by mouth daily  , Disp: , Rfl:     aspirin (ECOTRIN LOW STRENGTH) 81 mg EC tablet, Take 1 tablet by mouth daily, Disp: , Rfl:     furosemide (LASIX) 20 mg tablet, Take 1 tablet (20 mg total) by mouth daily, Disp: 30 tablet, Rfl: 0    [place] 150 MG CAPS, Take by mouth, Disp: , Rfl:     Iron-Vitamins (GERITOL COMPLETE PO), Take by mouth, Disp: , Rfl:     lidocaine (LIDODERM) 5 %, Place 1 patch on the skin daily Remove & Discard patch within 12 hours or as directed by MD, Disp: 30 patch, Rfl: 0    lisinopril (ZESTRIL) 10 mg tablet, Take 1 tablet (10 mg total) by mouth daily, Disp: 30 tablet, Rfl: 0    metoprolol tartrate (LOPRESSOR) 100 mg tablet, Take 0 5 tablets (50 mg total) by mouth every 12 (twelve) hours, Disp: 60 tablet, Rfl: 0    potassium chloride (MICRO-K) 10 MEQ CR capsule, Take 1 capsule (10 mEq total) by mouth every other day, Disp: 30 capsule, Rfl: 0    Thiamine HCl (VITAMIN B-1) 100 MG TABS, Take 1 tablet by mouth daily, Disp: , Rfl:     Zinc 100 MG TABS, Take by mouth, Disp: , Rfl:     benzonatate (TESSALON PERLES) 100 mg capsule, Take 1 capsule (100 mg total) by mouth 3 (three) times a day as needed for cough, Disp: 20 capsule, Rfl: 2      Physical Exam:  /80   Ht 6' (1 829 m)   Wt 63 kg (138 lb 12 8 oz)   BMI 18 82 kg/m²     Physical Exam   Constitutional: He is oriented to person, place, and time  He appears well-developed and well-nourished  No distress  Cardiovascular: S1 normal and S2 normal   An irregularly irregular rhythm present  Tachycardia present  Exam reveals no gallop, no S3, no S4 and no friction rub  No murmur heard  Pulmonary/Chest: Effort normal and breath sounds normal  No respiratory distress  He has no wheezes  He has no rales  Musculoskeletal: He exhibits edema (  Mild ankle edema bilaterally)  Neurological: He is alert and oriented to person, place, and time  Skin: He is not diaphoretic  Psychiatric: He has a normal mood and affect  His behavior is normal  Judgment and thought content normal    Vitals reviewed            Labs:  Lab Results   Component Value Date    WBC 8 68 08/09/2018    HGB 15 0 08/09/2018    HCT 44 9 08/09/2018     (H) 08/09/2018     (L) 08/09/2018     Lab Results   Component Value Date     08/13/2018    K 5 2 08/13/2018     08/13/2018    CO2 25 08/13/2018    ANIONGAP 8 08/13/2018    BUN 23 08/13/2018    CREATININE 1 00 08/13/2018    GLUCOSE 79 08/13/2018    GLUF 93 07/20/2018    CALCIUM 8 9 08/13/2018    AST 33 08/09/2018    ALT 46 08/09/2018    ALKPHOS 70 08/09/2018    PROT 7 2 08/09/2018    BILITOT 0 97 08/09/2018    EGFR 73 08/13/2018

## 2018-08-20 NOTE — TELEPHONE ENCOUNTER
Called wife    Resume all meds at prescribed dosages    If having diarrhea    May be c diff   To call before 8/22 if questions

## 2018-08-22 ENCOUNTER — OFFICE VISIT (OUTPATIENT)
Dept: CARDIOLOGY CLINIC | Facility: CLINIC | Age: 78
End: 2018-08-22
Payer: COMMERCIAL

## 2018-08-22 VITALS
DIASTOLIC BLOOD PRESSURE: 64 MMHG | SYSTOLIC BLOOD PRESSURE: 102 MMHG | WEIGHT: 137 LBS | RESPIRATION RATE: 16 BRPM | HEART RATE: 114 BPM | HEIGHT: 72 IN | BODY MASS INDEX: 18.56 KG/M2

## 2018-08-22 DIAGNOSIS — I42.0 DILATED CARDIOMYOPATHY (HCC): ICD-10-CM

## 2018-08-22 DIAGNOSIS — I50.23 ACUTE ON CHRONIC SYSTOLIC (CONGESTIVE) HEART FAILURE (HCC): ICD-10-CM

## 2018-08-22 DIAGNOSIS — I36.1 NON-RHEUMATIC TRICUSPID VALVE INSUFFICIENCY: ICD-10-CM

## 2018-08-22 DIAGNOSIS — I48.19 PERSISTENT ATRIAL FIBRILLATION (HCC): Primary | ICD-10-CM

## 2018-08-22 PROBLEM — I50.22 CHRONIC SYSTOLIC (CONGESTIVE) HEART FAILURE (HCC): Status: ACTIVE | Noted: 2018-08-22

## 2018-08-22 PROCEDURE — 99214 OFFICE O/P EST MOD 30 MIN: CPT | Performed by: INTERNAL MEDICINE

## 2018-08-22 PROCEDURE — 93000 ELECTROCARDIOGRAM COMPLETE: CPT | Performed by: INTERNAL MEDICINE

## 2018-08-22 RX ORDER — POTASSIUM CHLORIDE 750 MG/1
10 CAPSULE, EXTENDED RELEASE ORAL DAILY
Qty: 30 CAPSULE | Refills: 5 | Status: SHIPPED | OUTPATIENT
Start: 2018-08-22 | End: 2019-02-20 | Stop reason: SDUPTHER

## 2018-08-22 RX ORDER — FUROSEMIDE 20 MG/1
20 TABLET ORAL 2 TIMES DAILY
Qty: 60 TABLET | Refills: 5 | Status: SHIPPED | OUTPATIENT
Start: 2018-08-22

## 2018-08-22 RX ORDER — AMIODARONE HYDROCHLORIDE 200 MG/1
200 TABLET ORAL SEE ADMIN INSTRUCTIONS
Qty: 37 TABLET | Refills: 5 | Status: SHIPPED | OUTPATIENT
Start: 2018-08-22 | End: 2018-09-05

## 2018-08-22 NOTE — PROGRESS NOTES
Cardiology Follow Up    John Barnard  1940  [id number]  [address]  [address] [address] [phone]    Reason for visit: Here for FU in hospital for acute systolic CHF with EF of 56% (no ischemic on stress test), has persistent AFIB and severe TR with RVE    1  Persistent atrial fibrillation (HCC)  POCT ECG   2  Dilated cardiomyopathy ([address] )     3  Chronic systolic (congestive) heart failure ([address] )     4  Non-rheumatic tricuspid valve insufficiency     5  Acute diastolic heart failure (HCC)         Interval History:   SInce his DC last week, he has ongoing dyspnea with coughing  He has orthopnea  He denies palpitations  He denies edema  His wt has been stable at 131 bpm  He did have some dizziness yesterday  He denies CP  He stopped all his meds except lasix and KCL when he went home due to diarrhea  He was restarted on metoprolol at 50 mg BID last week   He did have cold hands with discoloration on the higher dosage        Patient Active Problem List   Diagnosis    Atrial fibrillation (HCC)    Chronic obstructive pulmonary disease (HCC)    Pain of right lower leg    Nodule of left lung    Chronic cough    Anxiety    Acute congestive heart failure (HCC)    Cardiomyopathy ([address] )    Restless leg syndrome, uncontrolled    Chronic systolic (congestive) heart failure ([address] )    Non-rheumatic tricuspid valve insufficiency     Past Medical History:   Diagnosis Date    Allergic rhinitis     last assessed [id number]    Atrial fibrillation ([facility] )     last assessed [id number]    Atypical chest pain     last assessed [id number]    Closed fracture of pubis ([facility] [address] )     Community acquired pneumonia     last assessed [id number]    COPD (chronic obstructive pulmonary disease) ([facility] [address] )     Nodule of right lung     last assessed [id number]    Pleural effusion     last assessed [id number]    Polyuria     last assessed [id number]  Traumatic pneumothorax      Social History     Social History    Marital status: /Civil Union     Spouse name: N/A    Number of children: N/A    Years of education: N/A     Occupational History    retired      tractor trailor  and farmer     Social History Main Topics    Smoking status: Former Smoker     Types: Pipe, [address] date: 1996    Smokeless tobacco: Never Used    Alcohol use No    Drug use: No    Sexual activity: Not on file     Other Topics Concern    Not on file     Social History Narrative    Advance directive on file          Family History   Problem Relation Age of Onset    Diabetes Father      Past Surgical History:   Procedure Laterality Date    HERNIA REPAIR      TOOTH EXTRACTION         Current Outpatient Prescriptions:     apixaban (ELIQUIS) 5 mg, Take 1 tablet (5 mg total) by mouth 2 (two) times a day, Disp: 60 tablet, Rfl: 0    ascorbic acid with irene hips 1000 MG tablet, Take 500 mg by mouth daily  , Disp: , Rfl:     benzonatate (TESSALON PERLES) 100 mg capsule, Take 1 capsule (100 mg total) by mouth 3 (three) times a day as needed for cough, Disp: 20 capsule, Rfl: 2    furosemide (LASIX) 20 mg tablet, Take 1 tablet (20 mg total) by mouth daily, Disp: 30 tablet, Rfl: 0    [place] 150 MG CAPS, Take by mouth, Disp: , Rfl:     Iron-Vitamins (GERITOL COMPLETE PO), Take by mouth, Disp: , Rfl:     lidocaine (LIDODERM) 5 %, Place 1 patch on the skin daily Remove & Discard patch within 12 hours or as directed by MD, Disp: 30 patch, Rfl: 0    metoprolol tartrate (LOPRESSOR) 100 mg tablet, Take 0 5 tablets (50 mg total) by mouth every 12 (twelve) hours, Disp: 60 tablet, Rfl: 0    potassium chloride (MICRO-K) 10 MEQ CR capsule, Take 1 capsule (10 mEq total) by mouth every other day, Disp: 30 capsule, Rfl: 0    Thiamine HCl (VITAMIN B-1) 100 MG TABS, Take 1 tablet by mouth daily, Disp: , Rfl:     Zinc 100 MG TABS, Take by mouth, Disp: , Rfl:   Allergies Allergen Reactions    Anoro Ellipta [Umeclidinium-Vilanterol] Nausea Only     Heart burn, anal drainage    Origanum Oil      Review of Systems:  Review of Systems   Constitutional: Positive for appetite change  Negative for diaphoresis, fatigue and unexpected weight change  Respiratory: Positive for cough and shortness of breath  Negative for chest tightness and wheezing  Cardiovascular: Negative for chest pain, palpitations and leg swelling  Gastrointestinal: Negative for abdominal pain, constipation, diarrhea (from antibiotics) and nausea  Genitourinary: Positive for frequency  Negative for dysuria, hematuria and scrotal swelling  Musculoskeletal: Positive for arthralgias and back pain  Negative for gait problem and joint swelling  Skin: Negative for pallor  Neurological: Negative for dizziness, seizures, speech difficulty, light-headedness and headaches  Psychiatric/Behavioral: Negative for agitation, behavioral problems, confusion and decreased concentration  Physical Exam:  Vitals:    08/22/18 1427   BP: 102/64   Pulse: (!) 114   Resp: 16   Weight: 62 1 kg (137 lb)   Height: 6' (1 829 m)       Physical Exam   Constitutional: He is oriented to person, place, and time  He appears well-developed and well-nourished  No distress  HENT:   Head: Normocephalic and atraumatic  Mouth/Throat: No oropharyngeal exudate  Eyes: Conjunctivae are normal  No scleral icterus  Neck: Neck supple  Normal carotid pulses and no JVD present  Carotid bruit is not present  No thyromegaly present  Cardiovascular: An irregularly irregular rhythm present  Tachycardia present  Exam reveals no gallop and no friction rub  Murmur heard  Systolic (lsb) murmur is present    No diastolic murmur is present   Pulses:       Dorsalis pedis pulses are 0 on the right side, and 0 on the left side  Posterior tibial pulses are 0 on the right side, and 0 on the left side     Pulmonary/Chest: Breath sounds normal  He has no wheezes  He has no rhonchi  He has no rales  Abdominal: Soft  He exhibits no mass  There is no hepatosplenomegaly  There is no tenderness  Musculoskeletal: He exhibits deformity (kyphosis)  He exhibits no edema or tenderness  Neurological: He is alert and oriented to person, place, and time  He has normal strength  No cranial nerve deficit or sensory deficit  Skin: Skin is warm and dry  No rash noted  No erythema  No pallor  Psychiatric: He has a normal mood and affect  His behavior is normal  Judgment and thought content normal           Discussion/Summary:  1  Acute on chronic systolic heart failure  Patient with new diagnosis of dilated cardiomyopathy likely on the basis of persistent tachycardia  Will increase furosemide to 20 mg b i d  And continue potassium at 10 mEq daily  Will check BMP next week  2  Persistent atrial fibrillation  Continue metoprolol at 50 mg twice daily  I would like to increase the dosage however he is having Raynaud's phenomenon from higher doses  Of beta-blocker  Will add amiodarone at this time at 200 mg b i d  For 1 week followed by 200 mg daily  This should affect some rate slowing  We anticipate doing direct current cardioversion after at least 3 weeks of systemic anticoagulation  He will resume Eliquis this evening  3  Dilated cardiomyopathy  See above comments  Likely related to uncontrolled atrial fibrillation  Would normally like to have him on afterload reducers  His blood pressure is low normal and we will continue with beta-blockers only at this time  4  Tricuspid regurgitation with right-sided enlargement  Likely related to volume overload on presentation  We will obviously  Reassess this when he is euvolemic        Follow-up 2-3 weeks    Kaden Rosenthal MD

## 2018-08-24 ENCOUNTER — TELEPHONE (OUTPATIENT)
Dept: CARDIOLOGY CLINIC | Facility: CLINIC | Age: 78
End: 2018-08-24

## 2018-08-24 ENCOUNTER — TELEPHONE (OUTPATIENT)
Dept: FAMILY MEDICINE CLINIC | Facility: CLINIC | Age: 78
End: 2018-08-24

## 2018-08-24 NOTE — TELEPHONE ENCOUNTER
WANTED YOU TO KNOW THAT PT WAS PUT BACK ON AMIODARONE 200 MG BID, HE IS DEATHLY SICK  HAS NO APPETITE, DIARRHEA, NAUSEA, DRY HEAVES, IRRITABLE  SHE CALLED DR PEREZ OFFICE AND WAS TOLD THAT WAS NORMAL  WANTED YOU TO KNOW

## 2018-08-24 NOTE — TELEPHONE ENCOUNTER
[facility] [address] wife who was with him at [facility] two days ago in our office  "Many changes in meds were made in preparation for his cardioversion in three weeks " Since the day he left our office and since making changes or adding medications, Karla Hanson has been in bed flat on his back and very sick  He is up all night dry heaving and is totally incontinent, for she is washing clothing non stop because he cannot make it to bathroom in time  She has no clue WHICH medication is causing this, but if she had to guess, it is the Amiodarone    She is calling to talk directly to Dr Rachel Phillips, "what should I do with him?" [phone]

## 2018-08-24 NOTE — TELEPHONE ENCOUNTER
Phone call to patients wife    Decrease amiodarone daily    Doubt this is all due to meds    Maybe needs to come in for evaluation of diarrhea

## 2018-08-29 ENCOUNTER — PATIENT OUTREACH (OUTPATIENT)
Dept: CARDIOLOGY CLINIC | Facility: CLINIC | Age: 78
End: 2018-08-29

## 2018-08-29 NOTE — PROGRESS NOTES
Heart Failure Care Coordination Note  RE: Call and spoke to patient who stated he was feeling well and eating more without diarrhea  His appetite is good and he is active again  He does weigh himself daily and is steady  He is trying to eat healthy foods  Encouraged him to listen to his body and not to over do it  I will follow again in 2 weeks as he has an OV with Dr Chang Riggs on 9/5

## 2018-09-05 ENCOUNTER — OFFICE VISIT (OUTPATIENT)
Dept: CARDIOLOGY CLINIC | Facility: CLINIC | Age: 78
End: 2018-09-05
Payer: COMMERCIAL

## 2018-09-05 VITALS
HEART RATE: 92 BPM | BODY MASS INDEX: 18.28 KG/M2 | DIASTOLIC BLOOD PRESSURE: 68 MMHG | WEIGHT: 135 LBS | HEIGHT: 72 IN | SYSTOLIC BLOOD PRESSURE: 90 MMHG

## 2018-09-05 DIAGNOSIS — I50.22 CHRONIC SYSTOLIC (CONGESTIVE) HEART FAILURE (HCC): ICD-10-CM

## 2018-09-05 DIAGNOSIS — I42.0 DILATED CARDIOMYOPATHY (HCC): ICD-10-CM

## 2018-09-05 DIAGNOSIS — I36.1 NON-RHEUMATIC TRICUSPID VALVE INSUFFICIENCY: ICD-10-CM

## 2018-09-05 DIAGNOSIS — I48.19 PERSISTENT ATRIAL FIBRILLATION (HCC): Primary | ICD-10-CM

## 2018-09-05 PROCEDURE — 99214 OFFICE O/P EST MOD 30 MIN: CPT | Performed by: INTERNAL MEDICINE

## 2018-09-05 RX ORDER — DIGOXIN 125 MCG
125 TABLET ORAL EVERY EVENING
Qty: 30 TABLET | Refills: 11 | Status: SHIPPED | OUTPATIENT
Start: 2018-09-05

## 2018-09-05 NOTE — PROGRESS NOTES
Cardiology Follow Up    Marguerite Holley  1940  [id number]  [address] [address] [phone]    Reason for visit: 2 week FU for Persistent AFIB, acute on chronic systolic CHF, NICM (possible rate related) and TR with RVE      1  Persistent atrial fibrillation ([facility] [address] )     2  Chronic systolic (congestive) heart failure ([facility] )     3  Dilated cardiomyopathy ([facility] )     4  Non-rheumatic tricuspid valve insufficiency         Interval History: The patient has done poorly with the current medical regimen  He continues to have diarrhea  He feels this is related to medications  He is no longer on Amiodarone  He is on metoprolol 50 mg BID  He is on lasix 20 mg daily  He is taking KCL 10 meq daily  He has ongoing edema and dyspnea with effort  He denies CP  He does have some orthopnea  He does have nocturnal coughing  His wt is stable at 130 lbs  He denies palpitations  He denies dizziness       Patient Active Problem List   Diagnosis    Atrial fibrillation (HCC)    Chronic obstructive pulmonary disease (HCC)    Pain of right lower leg    Nodule of left lung    Chronic cough    Anxiety    Acute congestive heart failure (HCC)    Cardiomyopathy ([facility] )    Restless leg syndrome, uncontrolled    Chronic systolic (congestive) heart failure ([facility] )    Non-rheumatic tricuspid valve insufficiency     Past Medical History:   Diagnosis Date    Allergic rhinitis     last assessed [id number]    Atrial fibrillation [facility])     last assessed [id number]    Atypical chest pain     last assessed [id number]    Closed fracture of pubis ([facility] )     Community acquired pneumonia     last assessed [id number]    COPD (chronic obstructive pulmonary disease) (HCC)     Nodule of right lung     last assessed [id number]    Pleural effusion     last assessed [id number]    Polyuria     last assessed [id number]    Traumatic pneumothorax      Social History     Social History    Marital status: /Civil Union     Spouse name: N/A    Number of children: N/A    Years of education: N/A     Occupational History    retired      tractor trailor  and farmer     Social History Main Topics    Smoking status: Former Smoker     Types: Pipe, [address] date: 1996    Smokeless tobacco: Never Used    Alcohol use No    Drug use: No    Sexual activity: Not on file     Other Topics Concern    Not on file     Social History Narrative    Advance directive on file          Family History   Problem Relation Age of Onset    Diabetes Father      Past Surgical History:   Procedure Laterality Date    HERNIA REPAIR      TOOTH EXTRACTION         Current Outpatient Prescriptions:     apixaban (ELIQUIS) 5 mg, Take 1 tablet (5 mg total) by mouth 2 (two) times a day (Patient taking differently: Take 5 mg by mouth daily  ), Disp: 60 tablet, Rfl: 5    ascorbic acid with irene hips 1000 MG tablet, Take 500 mg by mouth daily  , Disp: , Rfl:     furosemide (LASIX) 20 mg tablet, Take 1 tablet (20 mg total) by mouth 2 (two) times a day (Patient taking differently: Take 20 mg by mouth daily  ), Disp: 60 tablet, Rfl: 5    [facility] 150 MG CAPS, Take by mouth, Disp: , Rfl:     Iron-Vitamins (GERITOL COMPLETE PO), Take by mouth, Disp: , Rfl:     lidocaine (LIDODERM) 5 %, Place 1 patch on the skin daily Remove & Discard patch within 12 hours or as directed by MD, Disp: 30 patch, Rfl: 0    metoprolol tartrate (LOPRESSOR) 100 mg tablet, Take 0 5 tablets (50 mg total) by mouth every 12 (twelve) hours (Patient taking differently: Take 50 mg by mouth daily  ), Disp: 60 tablet, Rfl: 0    potassium chloride (MICRO-K) 10 MEQ CR capsule, Take 1 capsule (10 mEq total) by mouth daily, Disp: 30 capsule, Rfl: 5    Thiamine HCl (VITAMIN B-1) 100 MG TABS, Take 1 tablet by mouth daily, Disp: , Rfl:     Zinc 100 MG TABS, Take by mouth, Disp: , Rfl:     amiodarone 200 mg tablet, Take 1 tablet (200 mg total) by mouth see administration instructions One tablet BID for one week then one tablet daily (Patient not taking: Reported on 9/5/2018 ), Disp: 37 tablet, Rfl: 5    benzonatate (TESSALON PERLES) 100 mg capsule, Take 1 capsule (100 mg total) by mouth 3 (three) times a day as needed for cough (Patient not taking: Reported on 9/5/2018 ), Disp: 20 capsule, Rfl: 2  Allergies   Allergen Reactions    Anoro Ellipta [Umeclidinium-Vilanterol] Nausea Only     Heart burn, anal drainage    Origanum Oil        Review of Systems:  Review of Systems   Constitutional: Positive for appetite change  Negative for diaphoresis, fatigue and unexpected weight change  Respiratory: Positive for cough and shortness of breath  Negative for chest tightness and wheezing  Cardiovascular: Negative for chest pain, palpitations and leg swelling  Gastrointestinal: Positive for diarrhea  Negative for abdominal pain, constipation and nausea  Genitourinary: Positive for frequency  Negative for dysuria, hematuria and scrotal swelling  Musculoskeletal: Positive for arthralgias and back pain  Negative for gait problem and joint swelling  Skin: Negative for pallor  Neurological: Negative for dizziness, seizures, speech difficulty, light-headedness and headaches  Psychiatric/Behavioral: Negative for agitation, behavioral problems, confusion and decreased concentration  Physical Exam:  Vitals:    09/05/18 0950   BP: 90/68   Pulse: 92   Weight: 61 2 kg (135 lb)   Height: 6' (1 829 m)         Physical Exam   Constitutional: He is oriented to person, place, and time  He appears well-developed and well-nourished  No distress  HENT:   Head: Normocephalic and atraumatic  Mouth/Throat: No oropharyngeal exudate  Eyes: Conjunctivae are normal  No scleral icterus  Neck: Neck supple  Normal carotid pulses and no JVD present  Carotid bruit is not present  No thyromegaly present  Cardiovascular: Normal rate    An irregularly irregular rhythm present  Exam reveals no gallop and no friction rub  Murmur heard  Systolic (lsb) murmur is present    No diastolic murmur is present   Pulses:       Dorsalis pedis pulses are 0 on the right side, and 0 on the left side  Posterior tibial pulses are 0 on the right side, and 0 on the left side  Pulmonary/Chest: Breath sounds normal  He has no wheezes  He has no rhonchi  He has no rales  Abdominal: Soft  He exhibits no mass  There is no hepatosplenomegaly  There is no tenderness  Musculoskeletal: He exhibits deformity (kyphosis)  He exhibits no edema or tenderness  Neurological: He is alert and oriented to person, place, and time  He has normal strength  No cranial nerve deficit or sensory deficit  Skin: Skin is warm and dry  No rash noted  No erythema  No pallor  Psychiatric: He has a normal mood and affect  His behavior is normal  Judgment and thought content normal        Discussion/Summary:  1  Persistent AFib  Rate fairly reasonable but still high normal   Only taking metoprolol 50 mg twice daily at this time  Has done poorly with amiodarone due to diarrhea  Will abandon amiodarone therapy at this point  Will start digoxin at 0 125 mg daily  Will check digoxin level and proBNP in 2 weeks time  Told to try to advance eliquis to normal dosage  2  Chronic systolic heart failure  Perhaps slight fluid overload at this point but does not appear to be markedly fluid overloaded  Will continue furosemide at 20 mg daily along with potassium at 10 mEq daily  Check proBNP as noted  3  Dilated cardiomyopathy  I suspect this is on the basis of uncontrolled atrial fibrillation  Patient continues to wear LifeVest   Will continue beta-blocker  No room to add afterload reduction  Will reassess left ventricular function at appropriate interval  4  TR/RV dilation  Tenzin Oneill Possibly related to volume overload    Will reassess at appropriate interval      FU one month      Clari Baca Gladys Baltazar MD

## 2018-09-12 ENCOUNTER — PATIENT OUTREACH (OUTPATIENT)
Dept: CARDIOLOGY CLINIC | Facility: CLINIC | Age: 78
End: 2018-09-12

## 2018-09-12 NOTE — PROGRESS NOTES
HEART FAILURE CARE COORDINATOR:  Follow up post discharge phone call  Patient bright and alert and stated his diarrhea has cleared  His appetite is good, and he is able to climb stairs with less SOB  He has a f/u OV with Dr Janet Roman on 10/2

## 2018-09-21 DIAGNOSIS — I48.19 PERSISTENT ATRIAL FIBRILLATION (HCC): ICD-10-CM

## 2018-09-21 RX ORDER — APIXABAN 5 MG/1
TABLET, FILM COATED ORAL
Qty: 60 TABLET | Refills: 0 | Status: SHIPPED | OUTPATIENT
Start: 2018-09-21

## 2018-09-28 ENCOUNTER — APPOINTMENT (OUTPATIENT)
Dept: LAB | Facility: CLINIC | Age: 78
End: 2018-09-28
Payer: COMMERCIAL

## 2018-09-28 DIAGNOSIS — I50.23 ACUTE ON CHRONIC SYSTOLIC (CONGESTIVE) HEART FAILURE (HCC): ICD-10-CM

## 2018-09-28 DIAGNOSIS — I50.22 CHRONIC SYSTOLIC (CONGESTIVE) HEART FAILURE (HCC): ICD-10-CM

## 2018-09-28 DIAGNOSIS — I48.19 PERSISTENT ATRIAL FIBRILLATION (HCC): ICD-10-CM

## 2018-09-28 LAB
ANION GAP SERPL CALCULATED.3IONS-SCNC: 4 MMOL/L (ref 4–13)
BUN SERPL-MCNC: 16 MG/DL (ref 5–25)
CALCIUM SERPL-MCNC: 8.7 MG/DL (ref 8.3–10.1)
CHLORIDE SERPL-SCNC: 104 MMOL/L (ref 100–108)
CO2 SERPL-SCNC: 29 MMOL/L (ref 21–32)
CREAT SERPL-MCNC: 0.96 MG/DL (ref 0.6–1.3)
DIGOXIN SERPL-MCNC: 0.7 NG/ML (ref 0.8–2)
GFR SERPL CREATININE-BSD FRML MDRD: 76 ML/MIN/1.73SQ M
GLUCOSE P FAST SERPL-MCNC: 72 MG/DL (ref 65–99)
NT-PROBNP SERPL-MCNC: 4372 PG/ML
POTASSIUM SERPL-SCNC: 4.5 MMOL/L (ref 3.5–5.3)
SODIUM SERPL-SCNC: 137 MMOL/L (ref 136–145)

## 2018-09-28 PROCEDURE — 80048 BASIC METABOLIC PNL TOTAL CA: CPT

## 2018-09-28 PROCEDURE — 36415 COLL VENOUS BLD VENIPUNCTURE: CPT

## 2018-09-28 PROCEDURE — 80162 ASSAY OF DIGOXIN TOTAL: CPT

## 2018-09-28 PROCEDURE — 83880 ASSAY OF NATRIURETIC PEPTIDE: CPT

## 2018-10-02 ENCOUNTER — OFFICE VISIT (OUTPATIENT)
Dept: CARDIOLOGY CLINIC | Facility: CLINIC | Age: 78
End: 2018-10-02
Payer: COMMERCIAL

## 2018-10-02 VITALS
HEART RATE: 57 BPM | DIASTOLIC BLOOD PRESSURE: 74 MMHG | BODY MASS INDEX: 18.34 KG/M2 | SYSTOLIC BLOOD PRESSURE: 120 MMHG | WEIGHT: 135.2 LBS

## 2018-10-02 DIAGNOSIS — I42.0 DILATED CARDIOMYOPATHY (HCC): ICD-10-CM

## 2018-10-02 DIAGNOSIS — I48.19 PERSISTENT ATRIAL FIBRILLATION (HCC): Primary | ICD-10-CM

## 2018-10-02 DIAGNOSIS — I50.22 CHRONIC SYSTOLIC (CONGESTIVE) HEART FAILURE (HCC): ICD-10-CM

## 2018-10-02 DIAGNOSIS — I36.1 NON-RHEUMATIC TRICUSPID VALVE INSUFFICIENCY: ICD-10-CM

## 2018-10-02 PROCEDURE — 99214 OFFICE O/P EST MOD 30 MIN: CPT | Performed by: INTERNAL MEDICINE

## 2018-10-02 NOTE — PROGRESS NOTES
Cardiology Follow Up    Phill Mulligan  1940  [id number]  [address]  [address] [address] [phone]    Reason for visit: One month follow-up for persistent atrial fibrillation, chronic systolic heart failure, nonischemic cardiomyopathy likely rate related and tricuspid regurgitation with right ventricular enlargement    1  Persistent atrial fibrillation ([address] )     2  Dilated cardiomyopathy ([address] )     3  Chronic systolic (congestive) heart failure ([address] )     4  Non-rheumatic tricuspid valve insufficiency         Interval History:   The patient states his breathing is better  He does get palpitations  He is SOB but is much better  He denies CP  He denies orthopnea  He denies dizziness  He denies edema         Patient Active Problem List   Diagnosis    Atrial fibrillation (HCC)    Chronic obstructive pulmonary disease (HCC)    Pain of right lower leg    Nodule of left lung    Chronic cough    Anxiety    Acute congestive heart failure (HCC)    Cardiomyopathy ([address] )    Restless leg syndrome, uncontrolled    Chronic systolic (congestive) heart failure ([address] )    Non-rheumatic tricuspid valve insufficiency     Past Medical History:   Diagnosis Date    Allergic rhinitis     last assessed [id number]    Atrial fibrillation ([address] )     last assessed [id number]    Atypical chest pain     last assessed [id number]    Closed fracture of pubis ([address] )     Community acquired pneumonia     last assessed [id number]    COPD (chronic obstructive pulmonary disease) (HCC)     Nodule of right lung     last assessed [id number]    Pleural effusion     last assessed [id number]    Polyuria     last assessed [id number]    Traumatic pneumothorax      Social History     Social History    Marital status: /Civil Union     Spouse name: N/A    Number of children: N/A    Years of education: N/A     Occupational History    retired tractor trailor  and farmer     Social History Main Topics    Smoking status: Former Smoker     Types: Pipe, Cigars     Quit date: 1996    Smokeless tobacco: Never Used    Alcohol use No    Drug use: No    Sexual activity: Not on file     Other Topics Concern    Not on file     Social History Narrative    Advance directive on file          Family History   Problem Relation Age of Onset    Diabetes Father      Past Surgical History:   Procedure Laterality Date    HERNIA REPAIR      TOOTH EXTRACTION         Current Outpatient Prescriptions:     ascorbic acid with irene hips 1000 MG tablet, Take 500 mg by mouth daily  , Disp: , Rfl:     digoxin (LANOXIN) 0 125 mg tablet, Take 1 tablet (125 mcg total) by mouth every evening, Disp: 30 tablet, Rfl: 11    ELIQUIS 5 MG, TAKE 1 TABLET BY MOUTH TWICE A DAY, Disp: 60 tablet, Rfl: 0    furosemide (LASIX) 20 mg tablet, Take 1 tablet (20 mg total) by mouth 2 (two) times a day (Patient taking differently: Take 20 mg by mouth daily  ), Disp: 60 tablet, Rfl: 5    [place] 150 MG CAPS, Take by mouth, Disp: , Rfl:     Iron-Vitamins (GERITOL COMPLETE PO), Take by mouth, Disp: , Rfl:     lidocaine (LIDODERM) 5 %, Place 1 patch on the skin daily Remove & Discard patch within 12 hours or as directed by MD, Disp: 30 patch, Rfl: 0    potassium chloride (MICRO-K) 10 MEQ CR capsule, Take 1 capsule (10 mEq total) by mouth daily, Disp: 30 capsule, Rfl: 5    Thiamine HCl (VITAMIN B-1) 100 MG TABS, Take 1 tablet by mouth daily, Disp: , Rfl:     Zinc 100 MG TABS, Take by mouth, Disp: , Rfl:     metoprolol tartrate (LOPRESSOR) 100 mg tablet, Take 0 5 tablets (50 mg total) by mouth every 12 (twelve) hours (Patient not taking: Reported on 10/2/2018 ), Disp: 60 tablet, Rfl: 0  Allergies   Allergen Reactions    Anoro Ellipta [Umeclidinium-Vilanterol] Nausea Only     Heart burn, anal drainage    Origanum Oil      Review of Systems:  Review of Systems   Constitutional: Negative for appetite change, diaphoresis, fatigue and unexpected weight change  Respiratory: Positive for shortness of breath  Negative for cough, chest tightness and wheezing  Cardiovascular: Positive for palpitations  Negative for chest pain and leg swelling  Gastrointestinal: Negative for abdominal pain, constipation, diarrhea and nausea  Genitourinary: Positive for frequency  Negative for dysuria, hematuria and scrotal swelling  Musculoskeletal: Positive for arthralgias and back pain  Negative for gait problem and joint swelling  Skin: Negative for pallor  Neurological: Negative for dizziness, seizures, speech difficulty, light-headedness and headaches  Psychiatric/Behavioral: Negative for agitation, behavioral problems, confusion and decreased concentration  Physical Exam:  Vitals:    10/02/18 1808   BP: 120/74   BP Location: Left arm   Patient Position: Sitting   Cuff Size: Standard   Pulse: 57   Weight: 61 3 kg (135 lb 3 2 oz)       Physical Exam   Constitutional: He is oriented to person, place, and time  He appears well-developed and well-nourished  No distress  HENT:   Head: Normocephalic and atraumatic  Mouth/Throat: No oropharyngeal exudate  Eyes: Conjunctivae are normal  No scleral icterus  Neck: Neck supple  Normal carotid pulses and no JVD present  Carotid bruit is not present  No thyromegaly present  Cardiovascular: Normal rate  An irregularly irregular rhythm present  Exam reveals no gallop and no friction rub  Murmur heard  Systolic (lsb) murmur is present    No diastolic murmur is present   Pulses:       Dorsalis pedis pulses are 0 on the right side, and 0 on the left side  Posterior tibial pulses are 0 on the right side, and 0 on the left side  Pulmonary/Chest: Breath sounds normal  He has no wheezes  He has no rhonchi  He has no rales  Abdominal: Soft  He exhibits no mass  There is no hepatosplenomegaly  There is no tenderness  Musculoskeletal: He exhibits deformity (kyphosis)  He exhibits no edema or tenderness  Neurological: He is alert and oriented to person, place, and time  He has normal strength  No cranial nerve deficit or sensory deficit  Skin: Skin is warm and dry  No rash noted  No erythema  No pallor  Psychiatric: He has a normal mood and affect  His behavior is normal  Judgment and thought content normal        Discussion/Summary:  1  Permanent AFIB  Hamida Cruel Rate suprisingly controlled on digoxin 80-90 bpm  Cant tolerate beta blocker or amiodarone    Is taking eliquis 5 BID  2  DCM-EF 30% hopefully due to tachycardia    Will check echo ltd next month  3  Chronic systolic CHF-probably still in CHF    Take lasix BID  Hamida Cruel Continue KCL once daily  4  TR/RVE  Hamida Cruel  Hopefully due to fluid overload and improved      FU 6 weeks with limited echo      Scot Maynard MD

## 2018-10-05 ENCOUNTER — OFFICE VISIT (OUTPATIENT)
Dept: FAMILY MEDICINE CLINIC | Facility: CLINIC | Age: 78
End: 2018-10-05
Payer: COMMERCIAL

## 2018-10-05 VITALS
BODY MASS INDEX: 17.9 KG/M2 | WEIGHT: 132.2 LBS | DIASTOLIC BLOOD PRESSURE: 64 MMHG | SYSTOLIC BLOOD PRESSURE: 118 MMHG | HEIGHT: 72 IN

## 2018-10-05 DIAGNOSIS — I50.22 CHRONIC SYSTOLIC (CONGESTIVE) HEART FAILURE (HCC): Primary | ICD-10-CM

## 2018-10-05 PROCEDURE — 99213 OFFICE O/P EST LOW 20 MIN: CPT | Performed by: FAMILY MEDICINE

## 2018-10-05 NOTE — PROGRESS NOTES
Assessment/Plan:  Patient examined stable today  He will continue the same medications  He will continue to follow up with Cardiology as scheduled  We will see him back in the next 2-3 months or p r n  No problem-specific Assessment & Plan notes found for this encounter  Diagnoses and all orders for this visit:    Chronic systolic (congestive) heart failure (HCC)          Subjective:      Patient ID: Teodora Gonsales is a 70 y o  male  Patient is here today for follow-up  Patient denies any chest pain or shortness of breath  Patient is now on Lasix BID and doing better on that  Patient still wearing a life vest, he will be getting an echocardiogram before he sees his Cardiology next month  The following portions of the patient's history were reviewed and updated as appropriate:   He  has a past medical history of Allergic rhinitis; Atrial fibrillation ([facility] ); Atypical chest pain; Closed fracture of pubis ([address] ); Community acquired pneumonia; COPD (chronic obstructive pulmonary disease) ([facility] [address] ); Nodule of right lung; Pleural effusion; Polyuria; and Traumatic pneumothorax  He   Patient Active Problem List    Diagnosis Date Noted    Chronic systolic (congestive) heart failure ([address] ) 08/22/2018    Non-rheumatic tricuspid valve insufficiency 08/22/2018    Cardiomyopathy ([address] ) 08/09/2018    Restless leg syndrome, uncontrolled 08/09/2018    Acute congestive heart failure ([address] ) 07/26/2018    Chronic cough 07/11/2018    Anxiety 07/11/2018    Nodule of left lung 04/25/2018    Pain of right lower leg 10/10/2017    Atrial fibrillation (HCC) 04/18/2013    Chronic obstructive pulmonary disease ([facility] ) 04/18/2013     He  has a past surgical history that includes Hernia repair and Tooth extraction  His family history includes Diabetes in his father  He  reports that he quit smoking about 22 years ago  His smoking use included Pipe and Cigars   He has never used smokeless tobacco  He reports that he does not drink alcohol or use drugs  Current Outpatient Prescriptions   Medication Sig Dispense Refill    ascorbic acid with irene hips 1000 MG tablet Take 500 mg by mouth daily        digoxin (LANOXIN) 0 125 mg tablet Take 1 tablet (125 mcg total) by mouth every evening 30 tablet 11    ELIQUIS 5 MG TAKE 1 TABLET BY MOUTH TWICE A DAY 60 tablet 0    furosemide (LASIX) 20 mg tablet Take 1 tablet (20 mg total) by mouth 2 (two) times a day (Patient taking differently: Take 20 mg by mouth daily  ) 60 tablet 5    [place] 150 MG CAPS Take by mouth      Iron-Vitamins (GERITOL COMPLETE PO) Take by mouth      lidocaine (LIDODERM) 5 % Place 1 patch on the skin daily Remove & Discard patch within 12 hours or as directed by MD 30 patch 0    potassium chloride (MICRO-K) 10 MEQ CR capsule Take 1 capsule (10 mEq total) by mouth daily 30 capsule 5    Thiamine HCl (VITAMIN B-1) 100 MG TABS Take 1 tablet by mouth daily      Zinc 100 MG TABS Take by mouth       No current facility-administered medications for this visit        Current Outpatient Prescriptions on File Prior to Visit   Medication Sig    ascorbic acid with irene hips 1000 MG tablet Take 500 mg by mouth daily      digoxin (LANOXIN) 0 125 mg tablet Take 1 tablet (125 mcg total) by mouth every evening    ELIQUIS 5 MG TAKE 1 TABLET BY MOUTH TWICE A DAY    furosemide (LASIX) 20 mg tablet Take 1 tablet (20 mg total) by mouth 2 (two) times a day (Patient taking differently: Take 20 mg by mouth daily  )    [place] 150 MG CAPS Take by mouth    Iron-Vitamins (GERITOL COMPLETE PO) Take by mouth    lidocaine (LIDODERM) 5 % Place 1 patch on the skin daily Remove & Discard patch within 12 hours or as directed by MD    potassium chloride (MICRO-K) 10 MEQ CR capsule Take 1 capsule (10 mEq total) by mouth daily    Thiamine HCl (VITAMIN B-1) 100 MG TABS Take 1 tablet by mouth daily    Zinc 100 MG TABS Take by mouth     No current facility-administered medications on file prior to visit  He is allergic to anoro ellipta [umeclidinium-vilanterol] and origanum oil       Review of Systems   Constitutional: Negative  Respiratory: Negative  Cardiovascular: Negative  As per HPI   Gastrointestinal: Negative  Genitourinary: Negative  Objective:      /64   Ht 6' (1 829 m)   Wt 60 kg (132 lb 3 2 oz)   BMI 17 93 kg/m²          Physical Exam   Constitutional: He is oriented to person, place, and time  He appears well-developed and well-nourished  No distress  Cardiovascular: Normal rate, regular rhythm and normal heart sounds  Exam reveals no gallop and no friction rub  No murmur heard  Pulmonary/Chest: Effort normal and breath sounds normal  No respiratory distress  He has no wheezes  He has no rales  Musculoskeletal: He exhibits no edema  Neurological: He is alert and oriented to person, place, and time  Skin: He is not diaphoretic  Psychiatric: He has a normal mood and affect  His behavior is normal  Judgment and thought content normal    Vitals reviewed

## 2018-11-01 ENCOUNTER — TELEPHONE (OUTPATIENT)
Dept: FAMILY MEDICINE CLINIC | Facility: CLINIC | Age: 78
End: 2018-11-01

## 2018-11-01 DIAGNOSIS — M19.90 ARTHRITIS: Primary | ICD-10-CM

## 2018-11-01 RX ORDER — METHOCARBAMOL 750 MG/1
750 TABLET, FILM COATED ORAL
Qty: 30 TABLET | Refills: 0 | Status: SHIPPED | OUTPATIENT
Start: 2018-11-01

## 2018-11-01 NOTE — TELEPHONE ENCOUNTER
PT IS PACKING TO MOVE AND HAS BEEN HAVING INCREASED INFLAMMATION  HE FOUND AN OLD BOTTLE OF DICLOFENAC 75 MG  HE HAS BEEN TAKING 2 IN THE AM AND PM  CAN YOU RX FOR SOME, SI IT OK TO TAKE IT LIKE THAT OR DO YOU WANT TO INCREASE THE DOSE  ALSO WOULD LIKE METHOCARBAMOL 750 MG TO TAKE ONE AT BEDTIME

## 2018-11-01 NOTE — TELEPHONE ENCOUNTER
He cannot take the diclofenac if he is on the Eliquis     This would put him at risk to bleed  He may take Tylenol over-the-counter  I will also put in for the methocarbamol

## 2018-11-08 ENCOUNTER — OFFICE VISIT (OUTPATIENT)
Dept: CARDIOLOGY CLINIC | Facility: CLINIC | Age: 78
End: 2018-11-08
Payer: COMMERCIAL

## 2018-11-08 VITALS
WEIGHT: 133.2 LBS | DIASTOLIC BLOOD PRESSURE: 62 MMHG | SYSTOLIC BLOOD PRESSURE: 100 MMHG | BODY MASS INDEX: 18.07 KG/M2 | HEART RATE: 62 BPM

## 2018-11-08 DIAGNOSIS — I36.1 NON-RHEUMATIC TRICUSPID VALVE INSUFFICIENCY: ICD-10-CM

## 2018-11-08 DIAGNOSIS — I42.0 DILATED CARDIOMYOPATHY (HCC): Primary | ICD-10-CM

## 2018-11-08 DIAGNOSIS — I50.22 CHRONIC SYSTOLIC (CONGESTIVE) HEART FAILURE (HCC): ICD-10-CM

## 2018-11-08 DIAGNOSIS — I48.19 PERSISTENT ATRIAL FIBRILLATION (HCC): ICD-10-CM

## 2018-11-08 PROCEDURE — 99214 OFFICE O/P EST MOD 30 MIN: CPT | Performed by: INTERNAL MEDICINE

## 2018-11-08 NOTE — PROGRESS NOTES
Cardiology Follow Up    Jaki Benjamin  1940  [id number]  [address] [id number] [phone]    Reason for visit: 6 week FU for persistent AFIB, chronic systolic CHF, NICM (likely rate related) and TR with RVE on initial echo    1  Dilated cardiomyopathy ([facility] )     2  Chronic systolic (congestive) heart failure (HCC)     3  Persistent atrial fibrillation ([facility] )     4  Non-rheumatic tricuspid valve insufficiency         Interval History:   Since his last visit he states he has minimal TERRAZAS  He denies edema  He denies palpitations or dizziness   He denies chest pain    Patient Active Problem List   Diagnosis    Atrial fibrillation (HCC)    Chronic obstructive pulmonary disease (HCC)    Pain of right lower leg    Nodule of left lung    Chronic cough    Anxiety    Acute congestive heart failure (HCC)    Cardiomyopathy ([facility] )    Restless leg syndrome, uncontrolled    Chronic systolic (congestive) heart failure ([facility] )    Non-rheumatic tricuspid valve insufficiency     Past Medical History:   Diagnosis Date    Allergic rhinitis     last assessed [id number]    Atrial fibrillation ([facility] )     last assessed [id number]    Atypical chest pain     last assessed [id number]    Closed fracture of pubis ([facility] )     Community acquired pneumonia     last assessed [id number]    [facility]ch[facility]     Nodule of right lung     last assessed [id number]    Pleural effusion     last assessed [id number]    Polyuria     last assessed [id number]    Traumatic pneumothorax      Social History     Social History    Marital status: /Civil Union     Spouse name: N/A    Number of children: N/A    Years of education: N/A     Occupational History    retired      tractor trailor  and farmer     Social History Main Topics    Smoking status: Former Smoker     Types: Pipe, Cigars     Quit date: 1996    Smokeless tobacco: Never Used    Alcohol use No    Drug use: No    Sexual activity: Not on file     Other Topics Concern    Not on file     Social History Narrative    Advance directive on file          Family History   Problem Relation Age of Onset    Diabetes Father      Past Surgical History:   Procedure Laterality Date    HERNIA REPAIR      TOOTH EXTRACTION         Current Outpatient Prescriptions:     ascorbic acid with irene hips 1000 MG tablet, Take 500 mg by mouth daily  , Disp: , Rfl:     digoxin (LANOXIN) 0 125 mg tablet, Take 1 tablet (125 mcg total) by mouth every evening, Disp: 30 tablet, Rfl: 11    ELIQUIS 5 MG, TAKE 1 TABLET BY MOUTH TWICE A DAY, Disp: 60 tablet, Rfl: 0    furosemide (LASIX) 20 mg tablet, Take 1 tablet (20 mg total) by mouth 2 (two) times a day, Disp: 60 tablet, Rfl: 5    lidocaine (LIDODERM) 5 %, Place 1 patch on the skin daily Remove & Discard patch within 12 hours or as directed by MD, Disp: 30 patch, Rfl: 0    methocarbamol (ROBAXIN) 750 mg tablet, Take 1 tablet (750 mg total) by mouth daily at bedtime, Disp: 30 tablet, Rfl: 0    potassium chloride (MICRO-K) 10 MEQ CR capsule, Take 1 capsule (10 mEq total) by mouth daily, Disp: 30 capsule, Rfl: 5    Thiamine HCl (VITAMIN B-1) 100 MG TABS, Take 1 tablet by mouth daily, Disp: , Rfl:     Zinc 100 MG TABS, Take by mouth, Disp: , Rfl:     Luquillo 150 MG CAPS, Take by mouth, Disp: , Rfl:     Iron-Vitamins (GERITOL COMPLETE PO), Take by mouth, Disp: , Rfl:   Allergies   Allergen Reactions    Anoro Ellipta [Umeclidinium-Vilanterol] Nausea Only     Heart burn, anal drainage    Origanum Oil        Review of Systems:  Review of Systems   Constitutional: Negative for appetite change, diaphoresis, fatigue and unexpected weight change  Respiratory: Positive for shortness of breath  Negative for cough, chest tightness and wheezing  Cardiovascular: Negative for chest pain, palpitations and leg swelling  Gastrointestinal: Negative for abdominal pain, constipation, diarrhea and nausea  Genitourinary: Positive for frequency  Negative for dysuria, hematuria and scrotal swelling  Musculoskeletal: Positive for arthralgias and back pain  Negative for gait problem and joint swelling  Skin: Negative for pallor  Neurological: Negative for dizziness, seizures, speech difficulty, light-headedness and headaches  Psychiatric/Behavioral: Negative for agitation, behavioral problems, confusion and decreased concentration  Physical Exam:  Vitals:    11/08/18 0854   BP: 100/62   BP Location: Right arm   Patient Position: Sitting   Cuff Size: Standard   Pulse: 62   Weight: 60 4 kg (133 lb 3 2 oz)       Physical Exam   Constitutional: He is oriented to person, place, and time  He appears well-developed and well-nourished  No distress  HENT:   Head: Normocephalic and atraumatic  Mouth/Throat: No oropharyngeal exudate  Eyes: Conjunctivae are normal  No scleral icterus  Neck: Neck supple  Normal carotid pulses and no JVD present  Carotid bruit is not present  No thyromegaly present  Cardiovascular: Normal rate  An irregularly irregular rhythm present  Exam reveals no gallop and no friction rub  Murmur heard  Crescendo decrescendo systolic (lsb) murmur is present with a grade of 2/6    No diastolic murmur is present   Pulses:       Dorsalis pedis pulses are 0 on the right side, and 0 on the left side  Posterior tibial pulses are 0 on the right side, and 0 on the left side  Pulmonary/Chest: Breath sounds normal  He has no wheezes  He has no rhonchi  He has no rales  Abdominal: Soft  He exhibits no mass  There is no hepatosplenomegaly  There is no tenderness  Musculoskeletal: He exhibits deformity (kyphosis)  He exhibits no edema or tenderness  Neurological: He is alert and oriented to person, place, and time  He has normal strength  No cranial nerve deficit or sensory deficit     Skin: Skin is warm and dry  No rash noted  No erythema  No pallor  Psychiatric: He has a normal mood and affect  His behavior is normal  Judgment and thought content normal           Discussion/Summary:  1  DCM  Kylah Rain Could not get echo    Will do quick look echo today to see if EF improved    Had normal Myoview stress test and likely nonischemic  Kylah Rain Suspect tachycardia mediated-cant tolerate ALRs (BP) or beta blockers (side effects)  2  Chronic systolic CHF  Kylah Rain Appears compensated on lasix 20 BID  3  Persistent AFIB  Kylah Rain Rate remarkably well controlled on digoxin    Did poorly with beta blockers  Vernesjosé miguel kaufman with hope of doing DCCV but tolerated poorly  4  TR/RVE on echo at time of volume overload    Still has lifevest  Quick look echo done shows EF of 45% with mild diffuse hypokinesis    Marked improvement    Still has moderate RVE with moderate TR (improved from severe) with mild PA HTN (TR jet 2 7 M/S)  Have advised DC LIFEVEST  Kylah Rain Decrease lasix to 20 mg daily and watch wt  (do this after move)    DC KCL when down to OD lasix  Continue eliquis long term        FU here prn    To establish with cardio in [place] Michael Schreiber MD

## 2019-02-20 DIAGNOSIS — I50.23 ACUTE ON CHRONIC SYSTOLIC (CONGESTIVE) HEART FAILURE (HCC): ICD-10-CM

## 2019-02-20 DIAGNOSIS — I50.42 CHRONIC COMBINED SYSTOLIC (CONGESTIVE) AND DIASTOLIC (CONGESTIVE) HEART FAILURE (HCC): Primary | ICD-10-CM

## 2019-02-20 RX ORDER — POTASSIUM CHLORIDE 750 MG/1
10 CAPSULE, EXTENDED RELEASE ORAL DAILY
Qty: 30 CAPSULE | Refills: 5 | Status: SHIPPED | OUTPATIENT
Start: 2019-02-20

## 2019-03-15 DIAGNOSIS — I48.19 PERSISTENT ATRIAL FIBRILLATION (HCC): ICD-10-CM

## 2019-03-16 RX ORDER — APIXABAN 5 MG/1
TABLET, FILM COATED ORAL
Qty: 60 TABLET | Refills: 0 | OUTPATIENT
Start: 2019-03-16

## 2019-03-19 DIAGNOSIS — I50.23 ACUTE ON CHRONIC SYSTOLIC (CONGESTIVE) HEART FAILURE (HCC): Primary | ICD-10-CM

## 2019-03-20 ENCOUNTER — TELEPHONE (OUTPATIENT)
Dept: CARDIOLOGY CLINIC | Facility: CLINIC | Age: 79
End: 2019-03-20

## 2019-03-20 RX ORDER — FUROSEMIDE 20 MG/1
20 TABLET ORAL 2 TIMES DAILY
Qty: 60 TABLET | Refills: 5 | OUTPATIENT
Start: 2019-03-20

## 2019-03-20 NOTE — TELEPHONE ENCOUNTER
I spoke with Kevin Tanner and he did move , I did also contacted  [facility] to informed them, they  will no longer send prescription request for Pt

## 2020-01-08 DIAGNOSIS — I50.42 CHRONIC COMBINED SYSTOLIC (CONGESTIVE) AND DIASTOLIC (CONGESTIVE) HEART FAILURE (HCC): ICD-10-CM

## 2020-01-08 RX ORDER — POTASSIUM CHLORIDE 750 MG/1
CAPSULE, EXTENDED RELEASE ORAL
Qty: 90 CAPSULE | Refills: 0 | OUTPATIENT
Start: 2020-01-08
